# Patient Record
Sex: FEMALE | Race: WHITE
[De-identification: names, ages, dates, MRNs, and addresses within clinical notes are randomized per-mention and may not be internally consistent; named-entity substitution may affect disease eponyms.]

---

## 2017-05-10 ENCOUNTER — HOSPITAL ENCOUNTER (OUTPATIENT)
Dept: HOSPITAL 56 - MW.ONC | Age: 82
Discharge: HOME | End: 2017-05-10
Attending: INTERNAL MEDICINE
Payer: MEDICARE

## 2017-05-10 DIAGNOSIS — C85.80: Primary | ICD-10-CM

## 2017-05-10 LAB
CHLORIDE SERPL-SCNC: 104 MMOL/L (ref 98–110)
SODIUM SERPL-SCNC: 134 MMOL/L (ref 136–146)

## 2017-12-31 ENCOUNTER — HOSPITAL ENCOUNTER (INPATIENT)
Dept: HOSPITAL 56 - MW.ED | Age: 82
LOS: 5 days | Discharge: NURSING, REHAB OR LONG-TERM CARE | DRG: 178 | End: 2018-01-05
Attending: INTERNAL MEDICINE | Admitting: INTERNAL MEDICINE
Payer: MEDICARE

## 2017-12-31 DIAGNOSIS — Z85.72: ICD-10-CM

## 2017-12-31 DIAGNOSIS — J13: ICD-10-CM

## 2017-12-31 DIAGNOSIS — N17.9: ICD-10-CM

## 2017-12-31 DIAGNOSIS — Z79.899: ICD-10-CM

## 2017-12-31 DIAGNOSIS — R06.02: ICD-10-CM

## 2017-12-31 DIAGNOSIS — R13.10: ICD-10-CM
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
## 2017-12-31 DIAGNOSIS — N39.0: ICD-10-CM

## 2017-12-31 DIAGNOSIS — M06.9: ICD-10-CM

## 2017-12-31 DIAGNOSIS — T45.1X5A: ICD-10-CM

## 2017-12-31 DIAGNOSIS — J69.0: Primary | ICD-10-CM

## 2017-12-31 DIAGNOSIS — K90.0: ICD-10-CM

## 2017-12-31 DIAGNOSIS — Z88.2: ICD-10-CM

## 2017-12-31 DIAGNOSIS — Z88.8: ICD-10-CM

## 2017-12-31 DIAGNOSIS — R53.1: ICD-10-CM

## 2017-12-31 DIAGNOSIS — R47.81: ICD-10-CM

## 2017-12-31 DIAGNOSIS — T50.8X5A: ICD-10-CM

## 2017-12-31 DIAGNOSIS — Z88.0: ICD-10-CM

## 2017-12-31 LAB
CHLORIDE SERPL-SCNC: 100 MMOL/L (ref 98–110)
SODIUM SERPL-SCNC: 132 MMOL/L (ref 136–146)

## 2017-12-31 PROCEDURE — 87077 CULTURE AEROBIC IDENTIFY: CPT

## 2017-12-31 PROCEDURE — 85025 COMPLETE CBC W/AUTO DIFF WBC: CPT

## 2017-12-31 PROCEDURE — 84484 ASSAY OF TROPONIN QUANT: CPT

## 2017-12-31 PROCEDURE — 87086 URINE CULTURE/COLONY COUNT: CPT

## 2017-12-31 PROCEDURE — 70450 CT HEAD/BRAIN W/O DYE: CPT

## 2017-12-31 PROCEDURE — 85610 PROTHROMBIN TIME: CPT

## 2017-12-31 PROCEDURE — 36415 COLL VENOUS BLD VENIPUNCTURE: CPT

## 2017-12-31 PROCEDURE — 36600 WITHDRAWAL OF ARTERIAL BLOOD: CPT

## 2017-12-31 PROCEDURE — 87040 BLOOD CULTURE FOR BACTERIA: CPT

## 2017-12-31 PROCEDURE — 87186 SC STD MICRODIL/AGAR DIL: CPT

## 2017-12-31 PROCEDURE — 82803 BLOOD GASES ANY COMBINATION: CPT

## 2017-12-31 PROCEDURE — 99285 EMERGENCY DEPT VISIT HI MDM: CPT

## 2017-12-31 PROCEDURE — 93005 ELECTROCARDIOGRAM TRACING: CPT

## 2017-12-31 PROCEDURE — 71010: CPT

## 2017-12-31 PROCEDURE — 96361 HYDRATE IV INFUSION ADD-ON: CPT

## 2017-12-31 PROCEDURE — 87804 INFLUENZA ASSAY W/OPTIC: CPT

## 2017-12-31 PROCEDURE — 83880 ASSAY OF NATRIURETIC PEPTIDE: CPT

## 2017-12-31 PROCEDURE — 81001 URINALYSIS AUTO W/SCOPE: CPT

## 2017-12-31 PROCEDURE — 94640 AIRWAY INHALATION TREATMENT: CPT

## 2017-12-31 PROCEDURE — 80053 COMPREHEN METABOLIC PANEL: CPT

## 2017-12-31 NOTE — PCM.HP
H&P History of Present Illness





- General


Admit Problem/Dx: 


 Admission Diagnosis/Problem





Admission Diagnosis/Problem      UTI, Urinary tract infectious disease











- History of Present Illness


Initial Comments - Free Text/Narative: 





89 yo female who presents with  shortness of breath, productive cough, and 

generalized weakness.  Patient reports have cold with post nasal drip several 

days prior to shortness of breath.  She denies any chest pain.  She was noted 

to have a fever of 39.1.  CXR showed no focal consolidation and UA had +2 

bacteria.





- Related Data


Allergies/Adverse Reactions: 


 Allergies











Allergy/AdvReac Type Severity Reaction Status Date / Time


 


cephalexin monohydrate Allergy  Hives Verified 12/31/17 14:41





[From Keflex]     


 


ciprofloxacin [From Cipro] Allergy  Other Verified 12/31/17 14:42


 


erythromycin base Allergy  Hives Verified 12/31/17 14:41


 


gluten Allergy  Stomach Verified 12/31/17 14:41





   Ache  


 


Penicillins Allergy  Hives Verified 12/31/17 14:41


 


Sulfa (Sulfonamide Allergy  Hives Verified 12/31/17 14:41





Antibiotics)     


 


contrast Dye Allergy  Hives Uncoded 12/31/17 14:41


 


tape adhesives (USE PAPER Allergy  Swelling Uncoded 12/31/17 14:41





TAPE ONLY     











Home Medications: 


 Home Meds





Calcitonin ([place]) [Miacalcin Nasal Spray] 1 inh INH DAILY 01/21/15 [History]


Levothyroxine Sodium [Synthroid] 1 tab PO ACBRK 01/21/15 [History]


Magnesium Oxide 400 mg PO BID 01/21/15 [History]


Multivitamin [Multi Vitamin Daily] 1 tab PO DAILY 01/21/15 [History]


Potassium Chloride 40 meq PO DAILY 01/21/15 [History]


Fluticasone Propionate [Flovent] 50 mcg NASBOTH DAILY PRN 12/31/17 [History]


Lutein/Minerals/Vit A,C & E [Ocuvite] PO DAILY 12/31/17 [History]











Past Medical History


HEENT History: Reports: Hard of Hearing, Impaired Vision, Other (See Below)


Other HEENT History: blurry vision and pain to left eye


Cardiovascular History: Reports: Arrhythmia, High Cholesterol


Respiratory History: Reports: Other (See Below)


Other Respiratory History: Oxygen use at NOC at home


Gastrointestinal History: Reports: Celiac Disease


Genitourinary History: Reports: Urinary Incontinence, UTI, Recurrent


Musculoskeletal History: Reports: Osteoporosis, RA


Neurological History: Reports: None


Psychiatric History: Reports: Anxiety, Depression


Endocrine/Metabolic History: Reports: Hyperthyroidism, Osteoporosis


Hematologic History: Reports: Anemia, Blood Transfusion(s)


Oncologic (Cancer) History: Reports: Lymphoma, Other (See Below)


Other Oncologic History: Waldenstrom's Lymphoma affecting lungs, abdomen, and 

sinuses


Dermatologic History: Reports: None





- Infectious Disease History


Infectious Disease History: Reports: Hepatitis non A,B,C, Measles, MRSA, Mumps, 

Rubella, Other (See Below)


Other Infectious Disease History: patient cleared for MRSA





- Past Surgical History


HEENT Surgical History: Reports: Cataract Surgery


Cardiovascular Surgical History: Reports: None


Respiratory Surgical History: Reports: None


GI Surgical History: Reports: Other (See Below)


Other GI Surgeries/Procedures: hx. feeding tube placement, no longer in place


Female  Surgical History: Reports: Hysterectomy


Neurological Surgical History: Reports: None


Musculoskeletal Surgical History: Reports: None





Social & Family History





- Family History


Family Medical History: Noncontributory





- Tobacco Use


Smoking Status *Q: Never Smoker


Second Hand Smoke Exposure: No





- Caffeine Use


Caffeine Use: Reports: Coffee


Other Caffeine Use: 1 cup per day





- Alcohol Use


Days Per Week of Alcohol Use: 0


Number of Drinks Per Day: 0


Total Drinks Per Week: 0





- Recreational Drug Use


Recreational Drug Use: No


Drug Use in Last 12 Months: No





H&P Review of Systems





- Review of Systems:


Review Of Systems: ROS reveals no pertinent complaints other than HPI.





Exam





- Exam


Exam: See Below





- Vital Signs


Vital Signs: 


 Last Vital Signs











Temp  36.6 C   12/31/17 17:45


 


Pulse  80   12/31/17 17:45


 


Resp  22 H  12/31/17 17:45


 


BP  88/50 L  12/31/17 17:45


 


Pulse Ox  96   12/31/17 17:45











Weight: 39.5 kg





- Exam


General: Alert, Oriented


HEENT: Mucosa Moist & [place]


Neck: No: JVD


Lungs: Normal Respiratory Effort, Decreased Breath Sounds


Cardiovascular: Regular Rate, Regular Rhythm


GI/Abdominal Exam: Soft, Non-Tender, No Distention


Extremities: Non-Tender, No Pedal Edema


Skin: Warm, Dry, Intact





- Patient Data


Result Diagrams: 


 01/01/18 06:15





 01/01/18 06:15





*Q Meaningful Use (ADM)





- VTE *Q


VTE Criteria *Q: 








- Stroke *Q


Stroke Criteria *Q: 








- AMI *Q


AMI Criteria *Q: 





Problem List Initiated/Reviewed/Updated: Yes


Orders Last 24hrs: 


 Active Orders 24 hr











 Category Date Time Status


 


 Antiembolic Devices [RC] PER UNIT ROUTINE Care  12/31/17 21:27 Ordered


 


 Oxygen Therapy [RC] PRN Care  12/31/17 21:25 Ordered


 


 RT Aerosol Therapy [RC] ASDIRECTED Care  12/31/17 21:27 Ordered


 


 VTE/DVT Education [RC] PER UNIT ROUTINE Care  12/31/17 21:25 Ordered


 


 Vital Signs [RC] Q4H Care  12/31/17 21:25 Ordered


 


 Gluten Free Diet [DIET] Diet  01/01/18 Breakfast Active


 


 BASIC METABOLIC PANEL,BMP [CHEM] AM Lab  01/01/18 05:11 Ordered


 


 BASIC METABOLIC PANEL,BMP [CHEM] AM Lab  01/02/18 05:11 Ordered


 


 CBC W/O DIFF,HEMOGRAM [HEME] AM Lab  01/01/18 05:11 Ordered


 


 CBC W/O DIFF,HEMOGRAM [HEME] AM Lab  01/02/18 05:11 Ordered


 


 CULTURE SPUTUM + SMEAR [RM] Stat Lab  12/31/17 21:25 Uncollected


 


 Albuterol/Ipratropium [DuoNeb 3.0-0.5 MG/3 ML] Med  12/31/17 21:25 Ordered





 3 ml NEB Q4HRRT PRN   


 


 Heparin Sodium Med  01/01/18 09:00 Ordered





 5,000 units SUBCUT Q12HR   


 


 Levofloxacin/Dextrose 5%-Water [Levaquin in D5W 750 MG/ Med  01/01/18 17:00 

Ordered





 150 ML] 750 mg   





 Premix Bag 1 bag   





 IV Q24H   


 


 Ondansetron [Zofran] Med  12/31/17 21:25 Ordered





 4 mg IVPUSH Q4H PRN   


 


 Sodium Chloride 0.9% [Normal Saline] 1,000 ml Med  12/31/17 18:30 Active





 IV ASDIRECTED   


 


 Sequential Compression Device [OM.PC] Per Unit Routine Oth  12/31/17 21:26 

Ordered


 


 Resuscitation Status Routine Resus Stat  12/31/17 21:25 Ordered








 Medication Orders





Sodium Chloride (Normal Saline)  1,000 mls @ 125 mls/hr IV STAT ABBEY


   Last Infusion: 12/31/17 14:35  Dose: 999 mls/hr


   Admin: 12/31/17 13:49  Dose: 125 mls/hr


Sodium Chloride (Normal Saline)  1,000 mls @ 75 mls/hr IV ASDIRECTED ABBEY


Levofloxacin/Dextrose 750 mg/ (Premix)  150 mls @ 100 mls/hr IV Q24H ABBEY


Sodium Chloride (Saline Flush)  10 ml FLUSH ASDIRECTED PRN


   PRN Reason: Keep Vein Open


   Last Admin: 12/31/17 13:49  Dose: 10 ml


Sodium Chloride (Saline Flush)  2.5 ml FLUSH ASDIRECTED PRN


   PRN Reason: Keep Vein Open


   Last Admin: 12/31/17 13:49  Dose: 2.5 ml








Assessment/Plan Comment:: 





89 yo female admitted for UTI and possible pneumonia.  We will treat with 

levaquin.  Cultures are pending.

## 2017-12-31 NOTE — EDM.PDOC
ED HPI GENERAL MEDICAL PROBLEM





- General


Stated Complaint: AMBULANCE


Time Seen by Provider: 12/31/17 13:20





- History of Present Illness


INITIAL COMMENTS - FREE TEXT/NARRATIVE: 


HISTORY AND PHYSICAL:





History of present illness:


Patient 88-year-old female history of lymphoma who presents with a concern of 

shortness of breath generalized weakness and slurred speech per daughter she 

equivocates regarding chest pain and no abdominal pain vomiting or other 

concerns. Upon arrival her saturations in the high 80s.





Review of systems: 


As per history of present illness and below otherwise all systems reviewed and 

negative.





Past medical history: 


As per history of present illness and as reviewed below otherwise 

noncontributory.





Surgical history: 


As per history of present illness and as reviewed below otherwise 

noncontributory.





Social history: 


No reported history of drug or alcohol abuse.





Family history: 


As per history of present illness and as reviewed below otherwise 

noncontributory.





Physical exam:


HEENT: Atraumatic, normocephalic, pupils reactive, negative for conjunctival 

pallor or scleral icterus, mucous membranes moist, throat clear, neck supple, 

nontender, trachea midline.


Lungs: Coarse breath sounds equal bilaterally, chest nontender.


Heart: S1S2, regular, negative for clicks, rubs, or JVD.


Abdomen: Soft, nondistended, nontender. Negative for masses or 

hepatosplenomegaly. Negative for costovertebral tenderness.


Pelvis: Stable nontender.


Genitourinary: Deferred.


Rectal: Deferred.


Extremities: Atraumatic, negative for cords or calf pain. Neurovascular 

unremarkable.


Neuro: Awake, alert, follows commands and moves all extremities extremely hard 

of hearing limited but grossly nonfocal exam Diagnostics:


CBC CMP PT/INR troponin UA urine culture sensitivity blood cultures 2 chest x-

ray CT brain EKG





Therapeutics:


IV O2 monitor





Impression: 


#1 dyspnea #2 hypoxemia #3 history of lymphoma #4 history of slurred speech





Definitive disposition and diagnosis as appropriate pending reevaluation and 

review of above.








- Related Data


 Allergies











Allergy/AdvReac Type Severity Reaction Status Date / Time


 


cephalexin monohydrate Allergy  Hives Verified 12/31/17 14:41





[From Keflex]     


 


ciprofloxacin [From Cipro] Allergy  Other Verified 12/31/17 14:42


 


erythromycin base Allergy  Hives Verified 12/31/17 14:41


 


gluten Allergy  Stomach Verified 12/31/17 14:41





   Ache  


 


Penicillins Allergy  Hives Verified 12/31/17 14:41


 


Sulfa (Sulfonamide Allergy  Hives Verified 12/31/17 14:41





Antibiotics)     


 


contrast Dye Allergy  Hives Uncoded 12/31/17 14:41


 


tape adhesives (USE PAPER Allergy  Swelling Uncoded 12/31/17 14:41





TAPE ONLY     











Home Meds: 


 Home Meds





Calcitonin ([place]) [Miacalcin Nasal Spray] 1 inh INH DAILY 01/21/15 [History]


Levothyroxine Sodium [Synthroid] 1 tab PO ACBRK 01/21/15 [History]


Magnesium Oxide 400 mg PO BID 01/21/15 [History]


Multivitamin [Multi Vitamin Daily] 1 tab PO DAILY 01/21/15 [History]


Potassium Chloride 40 meq PO DAILY 01/21/15 [History]


Triamcinolone Acetonide [Nasacort AQ [place]] 2 spray THERESA BID 01/21/15 [History]











Past Medical History


Oncologic (Cancer) History: Reports: Lymphoma, Other (See Below)


Other Oncologic History: Blood cancer and some other type of cancer of her 

throat/esophageal area but pt. wasn't exactly sure what type of cancer.





Social & Family History





- Family History


Family Medical History: Noncontributory





- Tobacco Use


Smoking Status *Q: Never Smoker


Second Hand Smoke Exposure: No





- Caffeine Use


Caffeine Use: Reports: None





- Alcohol Use


Days Per Week of Alcohol Use: 0


Number of Drinks Per Day: 0


Total Drinks Per Week: 0





- Recreational Drug Use


Recreational Drug Use: No


Drug Use in Last 12 Months: No





ED ROS GENERAL





- Review of Systems


Review Of Systems: ROS reveals no pertinent complaints other than HPI.





ED EXAM, GENERAL





- Physical Exam


Exam: See Below (See dictation)





Course





- Vital Signs


Last Recorded V/S: 


 Last Vital Signs











Temp  37.4 C   12/31/17 16:03


 


Pulse  98   12/31/17 15:31


 


Resp  12   12/31/17 15:31


 


BP  77/44 L  12/31/17 15:31


 


Pulse Ox  98   12/31/17 15:31














- Orders/Labs/Meds


Orders: 


 Active Orders 24 hr











 Category Date Time Status


 


 Cardiac Monitoring [RC] .AS DIRECTED Care  12/31/17 13:23 Active


 


 EKG Documentation Completion [RC] STAT Care  12/31/17 13:23 Active


 


 Oxygen Therapy, ED [RC] ASDIRECTED Care  12/31/17 13:23 Active


 


 Pulse Oximetry [RC] ASDIRECTED Care  12/31/17 13:23 Active


 


 RT Aerosol Therapy [RC] ASDIRECTED Care  12/31/17 13:26 Active


 


 Chest 1V Frontal [CR] Stat Exams  12/31/17 13:26 Taken


 


 Head wo Cont [CT] Stat Exams  12/31/17 13:26 Taken


 


 ABG [BLOOD GAS ARTERIAL] [BG] Stat Lab  12/31/17 15:53 Ordered


 


 CULTURE BLOOD [BC] Stat Lab  12/31/17 13:37 Received


 


 CULTURE BLOOD [BC] Stat Lab  12/31/17 14:02 Received


 


 CULTURE URINE [RM] Stat Lab  12/31/17 13:22 Received


 


 Levofloxacin/Dextrose 5%-Water [Levaquin in D5W 750 MG/ Med  12/31/17 16:57 

Active





 150 ML] 750 mg   





 Premix Bag 1 bag   





 IV ONETIME   


 


 Sodium Chloride 0.9% [Normal Saline] 1,000 ml Med  12/31/17 16:51 Active





 IV .Bolus   


 


 Sodium Chloride 0.9% [Normal Saline] 1,000 ml Med  12/31/17 13:30 Active





 IV STAT   


 


 Sodium Chloride 0.9% [Saline Flush] Med  12/31/17 13:26 Active





 10 ml FLUSH ASDIRECTED PRN   


 


 Sodium Chloride 0.9% [Saline Flush] Med  12/31/17 13:26 Active





 2.5 ml FLUSH ASDIRECTED PRN   


 


 Blood Culture x2 Reflex Set [OM.PC] Stat Oth  12/31/17 13:25 Ordered


 


 Saline Lock Insert [OM.PC] Stat Oth  12/31/17 13:23 Ordered








 Medication Orders





Sodium Chloride (Normal Saline)  1,000 mls @ 125 mls/hr IV STAT [facility]


   Last Infusion: 12/31/17 14:35  Dose: 999 mls/hr


   Admin: 12/31/17 13:49  Dose: 125 mls/hr


Sodium Chloride (Normal Saline)  1,000 mls @ 999 mls/hr IV .Bolus ONE


   Stop: 12/31/17 17:51


Levofloxacin/Dextrose 750 mg/ (Premix)  150 mls @ 100 mls/hr IV ONETIME ONE


   Stop: 12/31/17 18:26


Sodium Chloride (Saline Flush)  10 ml FLUSH ASDIRECTED PRN


   PRN Reason: Keep Vein Open


   Last Admin: 12/31/17 13:49  Dose: 10 ml


Sodium Chloride (Saline Flush)  2.5 ml FLUSH ASDIRECTED PRN


   PRN Reason: Keep Vein Open


   Last Admin: 12/31/17 13:49  Dose: 2.5 ml








Labs: 


 Laboratory Tests











  12/31/17 12/31/17 12/31/17 Range/Units





  13:22 13:24 13:24 


 


WBC   11.60 H   (4.0-11.0)  K/uL


 


RBC   4.09 L   (4.30-5.90)  M/uL


 


Hgb   12.5   (12.0-16.0)  g/dL


 


Hct   37.4   (36.0-46.0)  %


 


MCV   91.4   (80.0-98.0)  fL


 


MCH   30.6   (27.0-32.0)  pg


 


MCHC   33.4   (31.0-37.0)  g/dL


 


RDW Std Deviation   49.9   (28.0-62.0)  fl


 


RDW Coeff of [place]non   15   (11.0-15.0)  %


 


Plt Count   279   (150-400)  K/uL


 


MPV   9.20   (7.40-12.00)  fL


 


Neut % (Auto)   81.8 H   (48.0-80.0)  %


 


Lymph % (Auto)   14.5 L   (16.0-40.0)  %


 


Mono % (Auto)   3.3   (0.0-15.0)  %


 


Eos % (Auto)   0.3   (0.0-7.0)  %


 


Baso % (Auto)   0.1   (0.0-1.5)  %


 


Neut # (Auto)   9.5 H   (1.4-5.7)  K/uL


 


Lymph # (Auto)   1.7   (0.6-2.4)  K/uL


 


Mono # (Auto)   0.4   (0.0-0.8)  K/uL


 


Eos # (Auto)   0.0   (0.0-0.7)  K/uL


 


Baso # (Auto)   0.0   (0.0-0.1)  K/uL


 


Nucleated RBC %   0.0   /100WBC


 


Nucleated RBCs #   0   K/uL


 


INR    1.05  (0.86-1.11)  


 


ABG pH     (7.35-7.45)  


 


ABG pCO2     (35-45)  mmHG


 


ABG pO2     ()  mmHG


 


ABG HCO3     (22-26)  mEq/L


 


ABG Total CO2     


 


ABG Base Excess     (-2.0-2.0)  


 


Sodium     (136-146)  mmol/L


 


Potassium     (3.5-5.1)  mmol/L


 


Chloride     ()  mmol/L


 


Carbon Dioxide     (21-31)  mmol/L


 


BUN     (6.0-23.0)  mg/dL


 


Creatinine     (0.6-1.5)  mg/dL


 


Est Cr Clr Drug Dosing     


 


Estimated GFR (MDRD)     ml/min


 


Glucose     ()  mg/dL


 


Calcium     (8.8-10.8)  mg/dL


 


Total Bilirubin     (0.1-1.5)  mg/dL


 


AST     (5-40)  IU/L


 


ALT     (8-54)  IU/L


 


Alkaline Phosphatase     ()  


 


Troponin I     (0.0-0.29)  NG/ML


 


B-Natriuretic Peptide     (<100)  PG/ML


 


Total Protein     (6.0-8.0)  g/dL


 


Albumin     (3.4-4.8)  g/dL


 


Globulin     (2.0-3.5)  g/dL


 


Albumin/Globulin Ratio     (1.3-2.8)  


 


Urine Color  YELLOW    


 


Urine Appearance  CLEAR    


 


Urine pH  6.0    (5.0-8.0)  


 


Ur Specific Gravity  1.025    (1.001-1.035)  


 


Urine Protein  30    (NEGATIVE)  mg/dL


 


Urine Glucose (UA)  NEGATIVE    (NEGATIVE)  mg/dL


 


Urine Ketones  NEGATIVE    (NEGATIVE)  mg/dL


 


Urine Occult Blood  TRACE-INTACT    (NEGATIVE)  


 


Urine Nitrite  NEGATIVE    (NEGATIVE)  


 


Urine Bilirubin  NEGATIVE    (NEGATIVE)  


 


Urine Urobilinogen  0.2    (<2.0)  EU/dL


 


Ur Leukocyte Esterase  NEGATIVE    (NEGATIVE)  


 


Urine RBC  0-1    (0-2/HPF)  


 


Urine WBC  2-3    (0-5/HPF)  


 


Ur Epithelial Cells  OCCASIONAL    (NONE-FEW)  


 


Urine Bacteria  2+ H    (NEGATIVE)  


 


Hyaline Casts  2-3    (0-2/LPF)  


 


Coarse Granular Casts  4-6    (NEGATIVE)  














  12/31/17 12/31/17 12/31/17 Range/Units





  13:24 13:24 15:25 


 


WBC     (4.0-11.0)  K/uL


 


RBC     (4.30-5.90)  M/uL


 


Hgb     (12.0-16.0)  g/dL


 


Hct     (36.0-46.0)  %


 


MCV     (80.0-98.0)  fL


 


MCH     (27.0-32.0)  pg


 


MCHC     (31.0-37.0)  g/dL


 


RDW Std Deviation     (28.0-62.0)  fl


 


RDW Coeff of [place]non     (11.0-15.0)  %


 


Plt Count     (150-400)  K/uL


 


MPV     (7.40-12.00)  fL


 


Neut % (Auto)     (48.0-80.0)  %


 


Lymph % (Auto)     (16.0-40.0)  %


 


Mono % (Auto)     (0.0-15.0)  %


 


Eos % (Auto)     (0.0-7.0)  %


 


Baso % (Auto)     (0.0-1.5)  %


 


Neut # (Auto)     (1.4-5.7)  K/uL


 


Lymph # (Auto)     (0.6-2.4)  K/uL


 


Mono # (Auto)     (0.0-0.8)  K/uL


 


Eos # (Auto)     (0.0-0.7)  K/uL


 


Baso # (Auto)     (0.0-0.1)  K/uL


 


Nucleated RBC %     /100WBC


 


Nucleated RBCs #     K/uL


 


INR     (0.86-1.11)  


 


ABG pH    7.465 H  (7.35-7.45)  


 


ABG pCO2    24 L  (35-45)  mmHG


 


ABG pO2    65 L  ()  mmHG


 


ABG HCO3    17 L  (22-26)  mEq/L


 


ABG Total CO2    15.2  


 


ABG Base Excess    -5.2 L  (-2.0-2.0)  


 


Sodium  132 L    (136-146)  mmol/L


 


Potassium  4.6    (3.5-5.1)  mmol/L


 


Chloride  100    ()  mmol/L


 


Carbon Dioxide  16 L    (21-31)  mmol/L


 


BUN  41 H    (6.0-23.0)  mg/dL


 


Creatinine  1.7 H    (0.6-1.5)  mg/dL


 


Est Cr Clr Drug Dosing  TNP    


 


Estimated GFR (MDRD)  28.4    ml/min


 


Glucose  107    ()  mg/dL


 


Calcium  9.1    (8.8-10.8)  mg/dL


 


Total Bilirubin  0.7    (0.1-1.5)  mg/dL


 


AST  56 H    (5-40)  IU/L


 


ALT  46    (8-54)  IU/L


 


Alkaline Phosphatase  68    ()  


 


Troponin I  0.10    (0.0-0.29)  NG/ML


 


B-Natriuretic Peptide   424 H   (<100)  PG/ML


 


Total Protein  8.5 H    (6.0-8.0)  g/dL


 


Albumin  3.3 L    (3.4-4.8)  g/dL


 


Globulin  5.2 H    (2.0-3.5)  g/dL


 


Albumin/Globulin Ratio  0.6 L    (1.3-2.8)  


 


Urine Color     


 


Urine Appearance     


 


Urine pH     (5.0-8.0)  


 


Ur Specific Gravity     (1.001-1.035)  


 


Urine Protein     (NEGATIVE)  mg/dL


 


Urine Glucose (UA)     (NEGATIVE)  mg/dL


 


Urine Ketones     (NEGATIVE)  mg/dL


 


Urine Occult Blood     (NEGATIVE)  


 


Urine Nitrite     (NEGATIVE)  


 


Urine Bilirubin     (NEGATIVE)  


 


Urine Urobilinogen     (<2.0)  EU/dL


 


Ur Leukocyte Esterase     (NEGATIVE)  


 


Urine RBC     (0-2/HPF)  


 


Urine WBC     (0-5/HPF)  


 


Ur Epithelial Cells     (NONE-FEW)  


 


Urine Bacteria     (NEGATIVE)  


 


Hyaline Casts     (0-2/LPF)  


 


Coarse Granular Casts     (NEGATIVE)  











Meds: 


Medications











Generic Name Dose Route Start Last Admin





  Trade Name Freq  PRN Reason Stop Dose Admin


 


Sodium Chloride  1,000 mls @ 125 mls/hr  12/31/17 13:30  12/31/17 14:35





  Normal Saline  IV   999 mls/hr





  STAT ABBEY   Infusion


 


Sodium Chloride  1,000 mls @ 999 mls/hr  12/31/17 16:51  





  Normal Saline  IV  12/31/17 17:51  





  .Bolus ONE   


 


Levofloxacin/Dextrose 750 mg/  150 mls @ 100 mls/hr  12/31/17 16:57  





  Premix  IV  12/31/17 18:26  





  ONETIME ONE   


 


Sodium Chloride  10 ml  12/31/17 13:26  12/31/17 13:49





  Saline Flush  FLUSH   10 ml





  ASDIRECTED PRN   Administration





  Keep Vein Open   


 


Sodium Chloride  2.5 ml  12/31/17 13:26  12/31/17 13:49





  Saline Flush  FLUSH   2.5 ml





  ASDIRECTED PRN   Administration





  Keep Vein Open   














Discontinued Medications














Generic Name Dose Route Start Last Admin





  Trade Name Freq  PRN Reason Stop Dose Admin


 


Acetaminophen  1,000 mg  12/31/17 14:14  12/31/17 14:34





  Tylenol Extra Strength  PO  12/31/17 14:15  Not Given





  ONETIME ONE   


 


Albuterol/Ipratropium  3 ml  12/31/17 13:26  12/31/17 13:33





  Duoneb 3.0-0.5 Mg/3 Ml  NEB  12/31/17 13:27  3 ml





  ONETIME ONE   Administration


 


Ibuprofen  200 mg  12/31/17 14:21  12/31/17 14:34





  Motrin  PO  12/31/17 14:22  Not Given





  ONETIME ONE   


 


Ibuprofen  400 mg  12/31/17 14:34  12/31/17 15:03





  Motrin  PO  12/31/17 14:35  400 mg





  ONETIME ONE   Administration


 


Ibuprofen  Confirm  12/31/17 14:33  12/31/17 15:02





  Motrin  Administered  12/31/17 14:34  Not Given





  Dose   





  400 mg   





  .ROUTE   





  .STK-MED ONE   














Departure





- Departure


Time of Disposition: 16:58


Disposition: Admitted As Inpatient 66


Condition: Good


Clinical Impression: 


 General weakness, UTI (urinary tract infection)








- Discharge Information


Referrals: 


PCP,None [Primary Care Provider] - 





- My Orders


Last 24 Hours: 


My Active Orders





12/31/17 13:22


CULTURE URINE [RM] Stat 





12/31/17 13:23


Cardiac Monitoring [RC] .AS DIRECTED 


EKG Documentation Completion [RC] STAT 


Oxygen Therapy, ED [RC] ASDIRECTED 


Pulse Oximetry [RC] ASDIRECTED 


Saline Lock Insert [OM.PC] Stat 





12/31/17 13:25


Blood Culture x2 Reflex Set [OM.PC] Stat 





12/31/17 13:26


RT Aerosol Therapy [RC] ASDIRECTED 


Chest 1V Frontal [CR] Stat 


Head wo Cont [CT] Stat 


Sodium Chloride 0.9% [Saline Flush]   10 ml FLUSH ASDIRECTED PRN 


Sodium Chloride 0.9% [Saline Flush]   2.5 ml FLUSH ASDIRECTED PRN 





12/31/17 13:30


Sodium Chloride 0.9% [Normal Saline] 1,000 ml IV STAT 





12/31/17 13:37


CULTURE BLOOD [BC] Stat 





12/31/17 14:02


CULTURE BLOOD [BC] Stat 





12/31/17 15:53


ABG [BLOOD GAS ARTERIAL] [BG] Stat 





12/31/17 16:51


Sodium Chloride 0.9% [Normal Saline] 1,000 ml IV .Bolus 





12/31/17 16:57


Levofloxacin/Dextrose 5%-Water [Levaquin in D5W 750 MG/150 ML] 750 mg   Premix 

Bag 1 bag IV ONETIME 














- Assessment/Plan


Last 24 Hours: 


My Active Orders





12/31/17 13:22


CULTURE URINE [RM] Stat 





12/31/17 13:23


Cardiac Monitoring [RC] .AS DIRECTED 


EKG Documentation Completion [RC] STAT 


Oxygen Therapy, ED [RC] ASDIRECTED 


Pulse Oximetry [RC] ASDIRECTED 


Saline Lock Insert [OM.PC] Stat 





12/31/17 13:25


Blood Culture x2 Reflex Set [OM.PC] Stat 





12/31/17 13:26


RT Aerosol Therapy [RC] ASDIRECTED 


Chest 1V Frontal [CR] Stat 


Head wo Cont [CT] Stat 


Sodium Chloride 0.9% [Saline Flush]   10 ml FLUSH ASDIRECTED PRN 


Sodium Chloride 0.9% [Saline Flush]   2.5 ml FLUSH ASDIRECTED PRN 





12/31/17 13:30


Sodium Chloride 0.9% [Normal Saline] 1,000 ml IV STAT 





12/31/17 13:37


CULTURE BLOOD [BC] Stat 





12/31/17 14:02


CULTURE BLOOD [BC] Stat 





12/31/17 15:53


ABG [BLOOD GAS ARTERIAL] [BG] Stat 





12/31/17 16:51


Sodium Chloride 0.9% [Normal Saline] 1,000 ml IV .Bolus 





12/31/17 16:57


Levofloxacin/Dextrose 5%-Water [Levaquin in D5W 750 MG/150 ML] 750 mg   Premix 

Bag 1 bag IV ONETIME

## 2018-01-01 LAB
CHLORIDE SERPL-SCNC: 114 MMOL/L (ref 98–110)
SODIUM SERPL-SCNC: 137 MMOL/L (ref 136–146)

## 2018-01-01 RX ADMIN — HYPROMELLOSES AND CARBOXYMETHYLCELLULOSE SODIUM SCH DROP: 3; 2.5 GEL OPHTHALMIC at 22:38

## 2018-01-01 RX ADMIN — DEXTROSE SCH UNITS: 10 SOLUTION INTRAVENOUS at 14:52

## 2018-01-01 RX ADMIN — Medication SCH DROP: at 22:38

## 2018-01-01 RX ADMIN — DEXTROSE SCH UNITS: 10 SOLUTION INTRAVENOUS at 22:38

## 2018-01-01 NOTE — PCM.PN
- Review of Systems


Systems Review Comment:: 





feeling better, reports generalized weakness, cough improved





- Patient Data


Vitals - Most Recent: 


 Last Vital Signs











Temp  36.5 C   01/01/18 11:37


 


Pulse  71   01/01/18 11:37


 


Resp  18   01/01/18 11:37


 


BP  91/52 L  01/01/18 11:37


 


Pulse Ox  97   01/01/18 11:37











Weight - Most Recent: 39.5 kg


I&O - Last 24 Hours: 


 Intake & Output











 01/01/18 01/01/18 01/01/18





 06:59 14:59 22:59


 


Intake Total 100  


 


Output Total 335  


 


Balance -235  











Lab Results Last 24 Hours: 


 Laboratory Results - last 24 hr











  01/01/18 01/01/18 Range/Units





  06:15 06:15 


 


WBC  20.17 H   (4.0-11.0)  K/uL


 


RBC  3.18 L   (4.30-5.90)  M/uL


 


Hgb  9.5 L   (12.0-16.0)  g/dL


 


Hct  28.6 L   (36.0-46.0)  %


 


MCV  89.9   (80.0-98.0)  fL


 


MCH  29.9   (27.0-32.0)  pg


 


MCHC  33.2   (31.0-37.0)  g/dL


 


RDW Std Deviation  48.5   (28.0-62.0)  fl


 


RDW Coeff of Brennon  15   (11.0-15.0)  %


 


Plt Count  178   (150-400)  K/uL


 


MPV  8.90   (7.40-12.00)  fL


 


Nucleated RBC %  0.0   /100WBC


 


Nucleated RBCs #  0   K/uL


 


Sodium   137  (136-146)  mmol/L


 


Potassium   3.5  (3.5-5.1)  mmol/L


 


Chloride   114 H  ()  mmol/L


 


Carbon Dioxide   13 L  (21-31)  mmol/L


 


BUN   37 H  (6.0-23.0)  mg/dL


 


Creatinine   1.4  (0.6-1.5)  mg/dL


 


Est Cr Clr Drug Dosing   17.32  mL/min


 


Estimated GFR (MDRD)   35.5  ml/min


 


Glucose   67  ()  mg/dL


 


Calcium   7.8 L  (8.8-10.8)  mg/dL











Med Orders - Current: 


 Current Medications





Albuterol/Ipratropium (Duoneb 3.0-0.5 Mg/3 Ml)  3 ml NEB Q4HRRT PRN


   PRN Reason: Shortness Of Breath/wheezing


Heparin Sodium (Porcine) (Heparin Sodium)  5,000 units SUBCUT Q12HR ABBEY


   Last Admin: 01/01/18 14:52 Dose:  5,000 units


Sodium Chloride (Normal Saline)  1,000 mls @ 125 mls/hr IV STAT [facility]


   Last Infusion: 12/31/17 14:35 Dose:  999 mls/hr


Sodium Chloride (Normal Saline)  1,000 mls @ 75 mls/hr IV ASDIRECTED [facility]


   Last Admin: 01/01/18 12:35 Dose:  75 mls/hr


Levofloxacin/Dextrose 250 mg/ (Premix)  50 mls @ 100 mls/hr IV Q48H ABBEY


Sodium Chloride (Normal Saline)  500 mls @ 500 mls/hr IV .BOLUS [facility]


   Last Admin: 12/31/17 22:18 Dose:  500 mls/hr


Meropenem 500 mg/ Sodium (Chloride)  50 mls @ 100 mls/hr IV Q12H [facility]


   Last Admin: 01/01/18 14:46 Dose:  100 mls/hr


Ondansetron HCl (Zofran)  4 mg IVPUSH Q4H PRN


   PRN Reason: Nausea


Sodium Chloride (Saline Flush)  10 ml FLUSH ASDIRECTED PRN


   PRN Reason: Keep Vein Open


   Last Admin: 12/31/17 13:49 Dose:  10 ml


Sodium Chloride (Saline Flush)  2.5 ml FLUSH ASDIRECTED PRN


   PRN Reason: Keep Vein Open


   Last Admin: 12/31/17 13:49 Dose:  2.5 ml


Vancomycin HCl (Pharmacy To Dose - Vancomycin)  1 dose .XX ASDIRECTED [facility]





Discontinued Medications





Acetaminophen (Tylenol Extra Strength)  1,000 mg PO ONETIME ONE


   Stop: 12/31/17 14:15


   Last Admin: 12/31/17 14:34 Dose:  Not Given


Albuterol/Ipratropium (Duoneb 3.0-0.5 Mg/3 Ml)  3 ml NEB ONETIME ONE


   Stop: 12/31/17 13:27


   Last Admin: 12/31/17 13:33 Dose:  3 ml


Sodium Chloride (Normal Saline)  1,000 mls @ 999 mls/hr IV .Bolus ONE


   Stop: 12/31/17 17:51


   Last Admin: 12/31/17 17:13 Dose:  999 mls/hr


Levofloxacin/Dextrose 750 mg/ (Premix)  150 mls @ 100 mls/hr IV ONETIME ONE


   Stop: 12/31/17 18:26


   Last Admin: 12/31/17 17:13 Dose:  100 mls/hr


Meropenem 1 gm/ Sodium (Chloride)  100 mls @ 200 mls/hr IV Q8H [facility]BE[facility]


Ibuprofen (Motrin)  200 mg PO ONETIME ONE


   Stop: 12/31/17 14:22


   Last Admin: 12/31/17 14:34 Dose:  Not Given


Ibuprofen (Motrin)  400 mg PO ONETIME ONE


   Stop: 12/31/17 14:35


   Last Admin: 12/31/17 15:03 Dose:  400 mg


Ibuprofen (Motrin) Confirm Administered Dose 400 mg .ROUTE .STK-MED ONE


   Stop: 12/31/17 14:34


   Last Admin: 12/31/17 15:02 Dose:  Not Given











- Exam


General: Alert, Cooperative, No Acute Distress


Lungs: Clear to Auscultation, Normal Respiratory Effort


Cardiovascular: Regular Rate, Regular Rhythm


GI/Abdominal Exam: Soft, Non-Tender


Extremities: No Pedal Edema


Skin: Warm, Dry, Intact





- Problem List Review


Problem List Initiated/Reviewed/Updated: Yes





- My Orders


Last 24 Hours: 


My Active Orders





01/01/18 09:00


Heparin Sodium   5,000 units SUBCUT Q12HR 





01/01/18 11:55


Abdomen Pelvis wo Cont [CT] Routine 


Chest wo Cont [CT] Routine 





01/01/18 14:15


Meropenem 500 mg   Sodium Chloride 0.9% [Normal Saline] 50 ml IV Q12H 





01/01/18 14:31


Hemoccult [OCCULT BLOOD DIAGNOSTIC] [OP] Routine 





01/01/18 15:30


Vancomycin Pharmacy to Dose [Pharmacy to Dose - Vancomycin]   1 dose .XX 

ASDIRECTED 





01/01/18 19:00


CULTURE BLOOD [BC] Routine 





01/01/18 Breakfast


Gluten Free Diet [DIET] 





01/02/18 05:11


BASIC METABOLIC PANEL,BMP [CHEM] AM 


CBC W/O DIFF,HEMOGRAM [HEME] AM 





01/02/18 17:00


Levofloxacin/Dextrose 5%-Water [Levaquin in D5W 250 MG/50 ML] 250 mg   Premix 

Bag 1 bag IV Q48H 





12/31/17 18:30


Sodium Chloride 0.9% [Normal Saline] 1,000 ml IV ASDIRECTED 





12/31/17 21:25


Oxygen Therapy [RC] PRN 


VTE/DVT Education [RC] PER UNIT ROUTINE 


Vital Signs [RC] Q4H 


CULTURE SPUTUM + SMEAR [RM] Stat 


Albuterol/Ipratropium [DuoNeb 3.0-0.5 MG/3 ML]   3 ml NEB Q4HRRT PRN 


Ondansetron [Zofran]   4 mg IVPUSH Q4H PRN 


Resuscitation Status Routine 





12/31/17 21:26


Sequential Compression Device [OM.PC] Per Unit Routine 





12/31/17 21:27


Antiembolic Devices [RC] PER UNIT ROUTINE 


RT Aerosol Therapy [RC] ASDIRECTED 





12/31/17 22:15


Sodium Chloride 0.9% [Normal Saline] 500 ml IV .BOLUS 














- Plan


Plan:: 





89 yo female admitted for UTI and possible pneumonia.  Blood cultures reported 

growing gram negative bacilli and then gram positive cocci.  WBC up to 20,170.  

Have expanding antibiotics to vancomycin, meropenum, and Levaquin.  Creatinine 

has improved to 1.4.  Will CT c/a/p to evaluate for source of infection.

## 2018-01-02 LAB
CHLORIDE SERPL-SCNC: 117 MMOL/L (ref 98–110)
SODIUM SERPL-SCNC: 140 MMOL/L (ref 136–146)

## 2018-01-02 RX ADMIN — CLOTRIMAZOLE SCH EACH: 10 LOZENGE ORAL; TOPICAL at 12:53

## 2018-01-02 RX ADMIN — DEXTROSE SCH UNITS: 10 SOLUTION INTRAVENOUS at 08:59

## 2018-01-02 RX ADMIN — Medication SCH DROP: at 05:48

## 2018-01-02 RX ADMIN — HYPROMELLOSES AND CARBOXYMETHYLCELLULOSE SODIUM SCH APPLIC: 3; 2.5 GEL OPHTHALMIC at 21:38

## 2018-01-02 RX ADMIN — Medication SCH DROP: at 21:37

## 2018-01-02 RX ADMIN — FLUTICASONE PROPIONATE SCH SPRAY: 50 SPRAY, METERED NASAL at 08:58

## 2018-01-02 RX ADMIN — Medication SCH DROP: at 13:01

## 2018-01-02 RX ADMIN — LEVOFLOXACIN SCH MLS/HR: 5 INJECTION, SOLUTION INTRAVENOUS at 16:34

## 2018-01-02 RX ADMIN — DEXTROSE SCH UNITS: 10 SOLUTION INTRAVENOUS at 21:39

## 2018-01-02 NOTE — CT
EXAM DATE: 17



PATIENT'S AGE: 88



Patient: ERIKA FISCHER



Facility: [place], [place]

Patient ID: [id number]

Site Patient ID: [id number].

Site Accession #: [id number].

: 1929

Study: CT Abdomen/Pelvis [id number]/1/2018 2:34:13 PM

Ordering Physician: Justen Eduardo



Final Report: 

Indication:

Bacteremia.



Comparison:

None available.



Technique:

CT chest, abdomen and pelvis without IV or oral contrast.



Findings:

Chest: Visualized thyroid is normal. Moderate atherosclerotic calcification of 
the non aneurysmal aorta. Multiple reactive appearing mediastinal subcentimeter 
lymph nodes. Probable retained secretions focally occludes the right lower lobe 
bronchus just after the takeoff from the bronchus intermedius. Moderately 
prominent airspace opacity with air bronchograms in the dependent right lower 
lobe and peribronchial right lower lobe. Small right pleural effusion is small 
and dependent. Some perihilar peribronchial opacity in the right middle lobe. 
Small highly likely benign 2-3 mm nodule lateral segment right upper lobe. No 
significant airspace opacity on the left. Pulmonary vascularity in the 
periphery is within normal limits for age. Mild senescent interstitial 
prominence at the periphery of both lungs. Biapical pleural scarring. No 
significant bone lesion or fracture. Wires from a previous left-sided cardiac 
pacing device seen in the left subclavian to the mid superior vena cava. 
Calcification mitral valve. Some calcification aortic valve. Heart size is 
normal. No pericardial effusion. Tiny left pleural effusion at the costophrenic 
angle.

Abdomen and pelvis: Cholecystectomy clips. Small splenule inferior hilum. 
Benign cyst left kidney. Some mild dystrophic amorphous calcification in both 
kidneys is in the periphery, may be within the capsule. Small saccular aneurysm 
of the aorta extends inferior and to the right mid infrarenal aorta. In 
greatest transverse diameter this measures about 18 x 12 mm (image 57 series 206
). The overall caliber of the aorta itself is not significantly increased. 
Diffuse atherosclerosis and ectasia. No pathologic retroperitoneal or 
mesenteric adenopathy. Some formed stool through the redundant colon. Small 
bowel appears unremarkable. No air or fluid in the peritoneum. Small 
diverticula of the somewhat thick-walled and trabeculated urinary bladder. Air 
and soft tissue fullness in the perineum may be rectocele. Uterus and adnexal 
structures are absent. Mild scoliosis to the left of the lower lumbar spine 
with degenerative disc and facet changes. No blastic or lytic bone lesion.



Impression:

1. Right lower lobe pneumonia. Aspiration pneumonia not excluded with some 
retained secretions suspected in the origin of the right lower lobe bronchus. 
Endoluminal mass lesion is felt less likely but not completely excluded. 
Followup to imaging to resolution recommended. 

2. Small bilateral pleural effusions.

3. Neurogenic bladder versus chronic outlet obstruction changes in the urinary 
bladder.

4. Clinical correlation for small rectocele recommended.

5. 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



Please note that all CT scans at this facility use dose modulation, iterative 
reconstruction, and/or weight-based dosing when appropriate to reduce radiation 
dose to as low as reasonably achievable.



Dictated by Robel Desai MD @ 2018 3:01PM

(Electronic Signature)



Report Signed by Proxy.
MTDD

## 2018-01-02 NOTE — CT
EXAM DATE: 17



PATIENT'S AGE: 88



Patient: ERIKA FISCHER



Facility: [place], [place]

Patient ID: [id number]

Site Patient ID: [id number].

Site Accession #: [id number].

: 1929

Study: CT Head [id number]/31/2017 3:18:31 PM

Ordering Physician: Julio Mosley



Final Report: 

Pain.



Technique: Noncontrast head CT comparison head CT 2016.



Findings:

Axial noncontrast images through the brain parenchyma demonstrate no acute 
intracranial hemorrhage or mass. No midline shift. There are periventricular 
hypo lucencies with generalized parenchymal volume loss likely reflecting 
chronic small vessel ischemic change. No abnormal extra-axial air or fluid 
collections. No midline shift. There is moderate mucosal thickening in the 
maxillary sinuses. Mucosal thickening in the ethmoid sinuses. The visualized 
paranasal sinuses, mastoid air cells skull scalp otherwise appears 
unremarkable. 



Impression:

1. No acute intracranial hemorrhage or mass.

2. Generalized parenchymal volume loss with periventricular hypo lucencies 
likely reflecting chronic small vessel ischemic change .

3. Ethmoid and maxillary sinus disease.



Please note that all CT scans at this facility use dose modulation, iterative 
reconstruction, and/or weight-based dosing when appropriate to reduce radiation 
dose to as low as reasonably achievable.



Dictated by Jill Melton MD @ Dec 31 2017 3:34PM

(Electronic Signature)



Report Signed by Proxy.
HAIM

## 2018-01-02 NOTE — CT
EXAM DATE: 17



PATIENT'S AGE: 88



Patient: ERIKA FISCHER



Facility: [place], [place]

Patient ID: [id number]

Site Patient ID: [id number].

Site Accession #: [id number].

: 1929

Study: CT Chest [id number]/1/2018 2:34:36 PM

Ordering Physician: Justen Eduardo



Final Report: 

Indication:

Bacteremia.



Comparison:

None available.



Technique:

CT chest, abdomen and pelvis without IV or oral contrast.



Findings:

Chest: Visualized thyroid is normal. Moderate atherosclerotic calcification of 
the non aneurysmal aorta. Multiple reactive appearing mediastinal subcentimeter 
lymph nodes. Probable retained secretions focally occludes the right lower lobe 
bronchus just after the takeoff from the bronchus intermedius. Moderately 
prominent airspace opacity with air bronchograms in the dependent right lower 
lobe and peribronchial right lower lobe. Small right pleural effusion is small 
and dependent. Some perihilar peribronchial opacity in the right middle lobe. 
Small highly likely benign 2-3 mm nodule lateral segment right upper lobe. No 
significant airspace opacity on the left. Pulmonary vascularity in the 
periphery is within normal limits for age. Mild senescent interstitial 
prominence at the periphery of both lungs. Biapical pleural scarring. No 
significant bone lesion or fracture. Wires from a previous left-sided cardiac 
pacing device seen in the left subclavian to the mid superior vena cava. 
Calcification mitral valve. Some calcification aortic valve. Heart size is 
normal. No pericardial effusion. Tiny left pleural effusion at the costophrenic 
angle.

Abdomen and pelvis: Cholecystectomy clips. Small splenule inferior hilum. 
Benign cyst left kidney. Some mild dystrophic amorphous calcification in both 
kidneys is in the periphery, may be within the capsule. Small saccular aneurysm 
of the aorta extends inferior and to the right mid infrarenal aorta. In 
greatest transverse diameter this measures about 18 x 12 mm (image 57 series 206
). The overall caliber of the aorta itself is not significantly increased. 
Diffuse atherosclerosis and ectasia. No pathologic retroperitoneal or 
mesenteric adenopathy. Some formed stool through the redundant colon. Small 
bowel appears unremarkable. No air or fluid in the peritoneum. Small 
diverticula of the somewhat thick-walled and trabeculated urinary bladder. Air 
and soft tissue fullness in the perineum may be rectocele. Uterus and adnexal 
structures are absent. Mild scoliosis to the left of the lower lumbar spine 
with degenerative disc and facet changes. No blastic or lytic bone lesion.



Impression:

1. Right lower lobe pneumonia. Aspiration pneumonia not excluded with some 
retained secretions suspected in the origin of the right lower lobe bronchus. 
Endoluminal mass lesion is felt less likely but not completely excluded. 
Followup to imaging to resolution recommended. 

2. Small bilateral pleural effusions.

3. Neurogenic bladder versus chronic outlet obstruction changes in the urinary 
bladder.

4. Clinical correlation for small rectocele recommended.

5. 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



Please note that all CT scans at this facility use dose modulation, iterative 
reconstruction, and/or weight-based dosing when appropriate to reduce radiation 
dose to as low as reasonably achievable.



Dictated by Robel Desai MD @ 2018 3:19PM

(Electronic Signature)



Report Signed by Proxy.
MTDD

## 2018-01-02 NOTE — PCM.PN
Addendum entered and electronically signed by Kristine Laboy NP  01/02/18 12:30

: 





After discussion with Dr. Campuzano, will de-escalate antibiotics, stop Meropenem 

and Vancomycin today. Keep Levaquin IV. 





Original Note:








- General Info


Date of Service: 01/02/18


Admission Dx/Problem (Free Text): 


 Admission Diagnosis/Problem





Admission Diagnosis/Problem      UTI, Urinary tract infectious disease








Subjective Update: 





Feeling a little better today, slept better last night. Cough is more loose 

today, but is unable to get anything up. Denies chest pain. Scant SOB. No 

palpitations. Continues to have malaise but this is improving. 


Functional Status: Reports: Pain Controlled, Tolerating Diet, Ambulating, 

Urinating





- Review of Systems


General: Reports: Weakness, Malaise


HEENT: Reports: No Symptoms.  Denies: Headaches, Sore Throat, Visual Changes


Pulmonary: Reports: Cough.  Denies: Shortness of Breath, Pleuritic Chest Pain, 

Sputum, Hemoptysis, Wheezing


Cardiovascular: Reports: No Symptoms.  Denies: Chest Pain, Palpitations, Edema, 

Lightheadedness


Gastrointestinal: Reports: No Symptoms, Flatus.  Denies: Abdominal Pain, 

Diarrhea, Nausea, Vomiting


Genitourinary: Reports: No Symptoms.  Denies: Dysuria, Frequency, Burning, Pain


Musculoskeletal: Reports: No Symptoms


Neurological: Reports: No Symptoms.  Denies: Confusion


Psychiatric: Reports: No Symptoms.  Denies: Confusion





- Patient Data


Vitals - Most Recent: 


 Last Vital Signs











Temp  97.9 F   01/02/18 04:00


 


Pulse  73   01/02/18 04:00


 


Resp  16   01/02/18 04:00


 


BP  102/57 L  01/02/18 04:00


 


Pulse Ox  95   01/02/18 04:00











Weight - Most Recent: 39.5 kg


I&O - Last 24 Hours: 


 Intake & Output











 01/01/18 01/02/18 01/02/18





 22:59 06:59 14:59


 


Intake Total 330 1094 


 


Output Total 520  


 


Balance -190 1094 











Lab Results Last 24 Hours: 


 Laboratory Results - last 24 hr











  01/02/18 01/02/18 Range/Units





  05:23 05:23 


 


WBC  13.23 H   (4.0-11.0)  K/uL


 


RBC  3.05 L   (4.30-5.90)  M/uL


 


Hgb  9.2 L   (12.0-16.0)  g/dL


 


Hct  27.5 L   (36.0-46.0)  %


 


MCV  90.2   (80.0-98.0)  fL


 


MCH  30.2   (27.0-32.0)  pg


 


MCHC  33.5   (31.0-37.0)  g/dL


 


RDW Std Deviation  49.5   (28.0-62.0)  fl


 


RDW Coeff of Brennon  15   (11.0-15.0)  %


 


Plt Count  172   (150-400)  K/uL


 


MPV  8.80   (7.40-12.00)  fL


 


Nucleated RBC %  0.0   /100WBC


 


Nucleated RBCs #  0   K/uL


 


Sodium   140  (136-146)  mmol/L


 


Potassium   3.3 L  (3.5-5.1)  mmol/L


 


Chloride   117 H  ()  mmol/L


 


Carbon Dioxide   14 L  (21-31)  mmol/L


 


BUN   35 H  (6.0-23.0)  mg/dL


 


Creatinine   1.4  (0.6-1.5)  mg/dL


 


Est Cr Clr Drug Dosing   17.32  mL/min


 


Estimated GFR (MDRD)   35.5  ml/min


 


Glucose   70  ()  mg/dL


 


Calcium   8.0 L  (8.8-10.8)  mg/dL











Med Orders - Current: 


 Current Medications





Albuterol/Ipratropium (Duoneb 3.0-0.5 Mg/3 Ml)  3 ml NEB Q4HRRT PRN


   PRN Reason: Shortness Of Breath/wheezing


Artificial Tears (Refresh Plus 0.5%)  0 each EYEBOTH TID Count includes the [facility]


   Last Admin: 01/02/18 05:48 Dose:  1 drop


Carboxymethylcellu Sod/Hypromellose (Genteal Moderate To Severe Ophth Gel)  0 

ml EYELF BEDTIME [facility]


   Last Admin: 01/01/18 22:38 Dose:  1 drop


Fluticasone Propionate (Flonase)  0 gm NASBOTH DAILY [facility]


   Last Admin: 01/02/18 08:58 Dose:  1 spray


Heparin Sodium (Porcine) (Heparin Sodium)  5,000 units SUBCUT Q12HR [facility]


   Last Admin: 01/02/18 08:59 Dose:  5,000 units


Sodium Chloride (Normal Saline)  1,000 mls @ 75 mls/hr IV ASDIRECTED Count includes the [facility]


   Last Admin: 01/02/18 05:48 Dose:  75 mls/hr


Levofloxacin/Dextrose 250 mg/ (Premix)  50 mls @ 100 mls/hr IV Q48H Count includes the [facility]


Meropenem 500 mg/ Sodium (Chloride)  50 mls @ 100 mls/hr IV Q12H Count includes the [facility]


   Last Admin: 01/02/18 01:19 Dose:  100 mls/hr


Vancomycin HCl 500 mg/ Sodium (Chloride)  100 mls @ 100 mls/hr IV Q24H Count includes the [facility]


   Last Admin: 01/01/18 18:27 Dose:  100 mls/hr


Levothyroxine Sodium (Levothyroxine)  37.5 mcg PO DAILY@0700 Count includes the [facility]


   Last Admin: 01/02/18 06:41 Dose:  37.5 mcg


Naproxen (Naproxen Sodium)  110 mg PO BEDTIME PRN


   PRN Reason: Pain


   Last Admin: 01/01/18 22:54 Dose:  110 mg


Ondansetron HCl (Zofran)  4 mg IVPUSH Q4H PRN


   PRN Reason: Nausea


Dulera Oral Inh 100/ (5  **Own Med**)  0 each INH DAILY@1200 Count includes the [facility]


Potassium Chloride (Klor-Con M20)  40 meq PO ONETIME ONE


   Stop: 01/02/18 09:10


Sodium Chloride (Saline Flush)  10 ml FLUSH ASDIRECTED PRN


   PRN Reason: Keep Vein Open


   Last Admin: 12/31/17 13:49 Dose:  10 ml


Sodium Chloride (Saline Flush)  2.5 ml FLUSH ASDIRECTED PRN


   PRN Reason: Keep Vein Open


   Last Admin: 12/31/17 13:49 Dose:  2.5 ml


Vancomycin HCl (Pharmacy To Dose - Vancomycin)  1 dose .XX ASDIRECTED Count includes the [facility]





Discontinued Medications





Acetaminophen (Tylenol Extra Strength)  1,000 mg PO ONETIME ONE


   Stop: 12/31/17 14:15


   Last Admin: 12/31/17 14:34 Dose:  Not Given


Albuterol/Ipratropium (Duoneb 3.0-0.5 Mg/3 Ml)  3 ml NEB ONETIME ONE


   Stop: 12/31/17 13:27


   Last Admin: 12/31/17 13:33 Dose:  3 ml


Sodium Chloride (Normal Saline)  1,000 mls @ 125 mls/hr IV STAT Count includes the [facility]


   Last Infusion: 12/31/17 14:35 Dose:  999 mls/hr


Sodium Chloride (Normal Saline)  1,000 mls @ 999 mls/hr IV .Bolus ONE


   Stop: 12/31/17 17:51


   Last Admin: 12/31/17 17:13 Dose:  999 mls/hr


Levofloxacin/Dextrose 750 mg/ (Premix)  150 mls @ 100 mls/hr IV ONETIME ONE


   Stop: 12/31/17 18:26


   Last Admin: 12/31/17 17:13 Dose:  100 mls/hr


Sodium Chloride (Normal Saline)  500 mls @ 500 mls/hr IV .BOLUS [facility]


   Last Admin: 12/31/17 22:18 Dose:  500 mls/hr


Meropenem 1 gm/ Sodium (Chloride)  100 mls @ 200 mls/hr IV Q8H [facility]


   Last Admin: 01/01/18 23:44 Dose:  Not Given


Ibuprofen (Motrin)  200 mg PO ONETIME ONE


   Stop: 12/31/17 14:22


   Last Admin: 12/31/17 14:34 Dose:  Not Given


Ibuprofen (Motrin)  400 mg PO ONETIME ONE


   Stop: 12/31/17 14:35


   Last Admin: 12/31/17 15:03 Dose:  400 mg


Ibuprofen (Motrin) Confirm Administered Dose 400 mg .ROUTE .STK-MED ONE


   Stop: 12/31/17 14:34


   Last Admin: 12/31/17 15:02 Dose:  Not Given











- Exam


Quality Assessment: Supplemental Oxygen, DVT Prophylaxis


General: Alert, Oriented, Cooperative, No Acute Distress


HEENT: Pupils Equal, Mucous Membr. Moist/Pink


Neck: Supple


Lungs: Normal Respiratory Effort, Crackles (fine crackles to R lung base).  No: 

Rhonchi, Wheezing


Cardiovascular: Regular Rate, Regular Rhythm, No Murmurs


GI/Abdominal Exam: Normal Bowel Sounds, Soft, Non-Tender, No Organomegaly, No 

Distention, No Abnormal Bruit, No Mass, Pelvis Stable


Back Exam: Normal Inspection, Full Range of Motion


Extremities: Normal Inspection, Normal Range of Motion, Non-Tender, No Pedal 

Edema, Normal Capillary Refill


Neurological: No New Focal Deficit


Psy/Mental Status: Alert, Normal Affect, Normal Mood





- Problem List & Annotations


(1) Community acquired bacterial pneumonia


SNOMED Code(s): 230284520


   Code(s): J15.9 - UNSPECIFIED BACTERIAL PNEUMONIA   Status: Acute   Current 

Visit: Yes   





(2) UTI (urinary tract infection)


SNOMED Code(s): 78363120


   Code(s): N39.0 - URINARY TRACT INFECTION, SITE NOT SPECIFIED   Status: Acute

   Current Visit: Yes   





(3) Positive blood culture


SNOMED Code(s): 585100762


   Code(s): R78.81 - BACTEREMIA   Status: Acute   Current Visit: Yes   





(4) General weakness


SNOMED Code(s): 89537445


   Code(s): R53.1 - WEAKNESS   Status: Acute   Current Visit: Yes   





(5) EDWARD (acute kidney injury)


SNOMED Code(s): 17917613


   Code(s): N17.9 - ACUTE KIDNEY FAILURE, UNSPECIFIED   Status: Acute   Current 

Visit: Yes   





(6) Hx of lymphoma


SNOMED Code(s): 489415376


   Code(s): Z85.72 - PERSONAL HISTORY OF NON-HODGKIN LYMPHOMAS   Status: 

Chronic   Current Visit: Yes   





(7) History of recurrent UTIs


SNOMED Code(s): 444387460


   Code(s): Z87.440 - PERSONAL HISTORY OF URINARY (TRACT) INFECTIONS   Status: 

Chronic   Current Visit: Yes   





(8) Hx of rheumatoid arthritis


SNOMED Code(s): 418085334


   Code(s): Z87.39 - PERSONAL HISTORY OF DISEASES OF THE MS SYS AND CONN TISS   

Status: Chronic   Current Visit: Yes   





(9) Celiac disease


SNOMED Code(s): 920079115


   Code(s): K90.0 - CELIAC DISEASE   Status: Chronic   Current Visit: Yes   





- Problem List Review


Problem List Initiated/Reviewed/Updated: Yes





- My Orders


Last 24 Hours: 


My Active Orders





01/02/18 09:09


Potassium Chloride [Klor-Con M20]   40 meq PO ONETIME ONE 














- Plan


Plan:: 





89 yo female admitted for UTI and possible pneumonia.  





1. Pneumonia: Improving. Chest CT reveals R LLL pneumonia, "questionable 

aspiration due to retained secretions in origin of R lower lobe bronchus" per 

radiologist. Alos reveals, small bilateral pleural effusions. Antibiotic 

coverage expanded due to worsening leukocytosis and positive blood cultures. 

Today culture results corrected, no growth noted in anaerobic bottles. Aerobic 

bottles returned this morning with Strep pneumoniae. Leukocytosis improved to 13

,230. Continue Meropenem, Levaquin, and Vancomycin for now, may consider de-

escalating tomorrow. Does report dysphagia since having radiation and 

chemotherapy, throat feels very dry most days and makes it difficult to 

swallow. Will order ST evaluation. 





2. UTI: Improving. UC returned with Mixed mariana >100,000. Continue antibiotics 

as above. Urinating well.





3. EDWARD: Improving, BUN 35 and Cr 1.4 today. Baseline appears 1.2-1.6. Will 

monitor. 





4. Generalized weakness: Likely secondary to acute illness, but will order PT 

to evaluate and treat to keep strength. Encourage OOB to chair for all meals 

and ambulate with nursing. 





VTE prophylaxis: Heparin





Dispo: 2-3 days pending improvement

## 2018-01-02 NOTE — CR
EXAM DATE: 17



PATIENT'S AGE: 88



Patient: ERIKA FISCHER



Facility: [place], [place]

Patient ID: [id number]

Site Patient ID: [id number].

Site Accession #: [id number]

: 1929

Study: XRay Chest [id number]/31/2017 3:32:27 PM

Ordering Physician: Julio Mosley



Final Report: 

CHEST 1 VIEW AP



INDICATION:

Chest pain. Cough. Shortness of breath. 



COMPARISON:

2015. 



IMPRESSION:

Stable heart size and vascular pattern.

Lungs are clear of new focal opacities.

No pneumothorax or pleural abnormality.

Interstitial markings are mildly increased in overall prominence. No dense 
consolidation. Stable heart size.

There is a fragment of a previous retained central venous catheter which is in 
stable position with the distal aspect in the mid superior vena cava and 
catheter in the innominate vein. No pleural effusion or pneumothorax.



Dictated by Steve Leal MD @ Dec 31 2017 3:39PM

(Electronic Signature)



Report Signed by Proxy.
HAIM

## 2018-01-03 LAB
CHLORIDE SERPL-SCNC: 116 MMOL/L (ref 98–110)
SODIUM SERPL-SCNC: 138 MMOL/L (ref 136–146)

## 2018-01-03 RX ADMIN — Medication SCH DROP: at 12:05

## 2018-01-03 RX ADMIN — Medication SCH DROP: at 20:31

## 2018-01-03 RX ADMIN — HYPROMELLOSES AND CARBOXYMETHYLCELLULOSE SODIUM SCH APPLIC: 3; 2.5 GEL OPHTHALMIC at 20:31

## 2018-01-03 RX ADMIN — FLUTICASONE PROPIONATE SCH SPRAY: 50 SPRAY, METERED NASAL at 08:14

## 2018-01-03 RX ADMIN — CLOTRIMAZOLE SCH EACH: 10 LOZENGE ORAL; TOPICAL at 12:05

## 2018-01-03 RX ADMIN — DEXTROSE SCH UNITS: 10 SOLUTION INTRAVENOUS at 08:13

## 2018-01-03 RX ADMIN — Medication SCH DROP: at 08:20

## 2018-01-03 RX ADMIN — Medication SCH DROP: at 16:21

## 2018-01-03 RX ADMIN — POTASSIUM CHLORIDE SCH MEQ: 1.5 SOLUTION ORAL at 08:13

## 2018-01-03 RX ADMIN — DEXTROSE SCH UNITS: 10 SOLUTION INTRAVENOUS at 20:30

## 2018-01-03 NOTE — PCM.PN
- General Info


Date of Service: 01/03/18


Admission Dx/Problem (Free Text): 


 Admission Diagnosis/Problem





Admission Diagnosis/Problem      UTI, Urinary tract infectious disease








Subjective Update: 





Sitting in chair eating breakfast and visiting with Daughter. Appears perkier 

than previous day. Reports she is feeling improved today. Cough is improving, 

was able to provide sample. Denies chest pain or SOB. Energy is coming back. 


Functional Status: Reports: Pain Controlled, Tolerating Diet, Ambulating, 

Urinating





- Review of Systems


General: Reports: Malaise (but improving. ).  Denies: Fever


Pulmonary: Reports: Cough, Sputum (clear to yellow).  Denies: Shortness of 

Breath


Cardiovascular: Reports: No Symptoms.  Denies: Chest Pain


Gastrointestinal: Reports: No Symptoms.  Denies: Abdominal Pain, Nausea, 

Vomiting


Genitourinary: Reports: No Symptoms.  Denies: Dysuria, Frequency, Burning, Pain


Neurological: Reports: No Symptoms.  Denies: Confusion


Psychiatric: Reports: No Symptoms.  Denies: Confusion





- Patient Data


Vitals - Most Recent: 


 Last Vital Signs











Temp  97.1 F   01/03/18 04:00


 


Pulse  63   01/03/18 04:00


 


Resp  16   01/03/18 04:00


 


BP  125/55 L  01/03/18 04:00


 


Pulse Ox  96   01/03/18 04:00











Weight - Most Recent: 39.5 kg


I&O - Last 24 Hours: 


 Intake & Output











 01/02/18 01/03/18 01/03/18





 22:59 06:59 14:59


 


Intake Total 450 450 


 


Output Total 400 450 


 


Balance 50 0 











Lab Results Last 24 Hours: 


 Laboratory Results - last 24 hr











  01/03/18 01/03/18 Range/Units





  05:43 05:43 


 


WBC  8.63   (4.0-11.0)  K/uL


 


RBC  3.02 L   (4.30-5.90)  M/uL


 


Hgb  9.2 L   (12.0-16.0)  g/dL


 


Hct  27.5 L   (36.0-46.0)  %


 


MCV  91.1   (80.0-98.0)  fL


 


MCH  30.5   (27.0-32.0)  pg


 


MCHC  33.5   (31.0-37.0)  g/dL


 


RDW Std Deviation  50.5   (28.0-62.0)  fl


 


RDW Coeff of Brennon  15   (11.0-15.0)  %


 


Plt Count  181   (150-400)  K/uL


 


MPV  9.10   (7.40-12.00)  fL


 


Neut % (Auto)  73.8   (48.0-80.0)  %


 


Lymph % (Auto)  19.1   (16.0-40.0)  %


 


Mono % (Auto)  3.8   (0.0-15.0)  %


 


Eos % (Auto)  3.2   (0.0-7.0)  %


 


Baso % (Auto)  0.1   (0.0-1.5)  %


 


Neut # (Auto)  6.4 H   (1.4-5.7)  K/uL


 


Lymph # (Auto)  1.7   (0.6-2.4)  K/uL


 


Mono # (Auto)  0.3   (0.0-0.8)  K/uL


 


Eos # (Auto)  0.3   (0.0-0.7)  K/uL


 


Baso # (Auto)  0.0   (0.0-0.1)  K/uL


 


Nucleated RBC %  0.0   /100WBC


 


Nucleated RBCs #  0   K/uL


 


Sodium   138  (136-146)  mmol/L


 


Potassium   4.4  (3.5-5.1)  mmol/L


 


Chloride   116 H  ()  mmol/L


 


Carbon Dioxide   14 L  (21-31)  mmol/L


 


BUN   36 H  (6.0-23.0)  mg/dL


 


Creatinine   1.3  (0.6-1.5)  mg/dL


 


Est Cr Clr Drug Dosing   18.65  mL/min


 


Estimated GFR (MDRD)   38.7  ml/min


 


Glucose   69  ()  mg/dL


 


Calcium   8.5 L  (8.8-10.8)  mg/dL


 


Magnesium   2.3  (1.5-2.3)  mEq/L











Sean Results Last 24 Hours: 


 Microbiology











 01/01/18 19:02 Aerobic Blood Culture - Preliminary





 Blood    NO GROWTH AFTER 1 DAY





 Anaerobic Blood Culture - Preliminary





    NO GROWTH AFTER 1 DAY


 


 01/02/18 15:50 Stool Occult Blood (SEAN) - Final





 Stool / Feces    NEGATIVE OCCULT BLOOD











Med Orders - Current: 


 Current Medications





Albuterol/Ipratropium (Duoneb 3.0-0.5 Mg/3 Ml)  3 ml NEB Q4HRRT PRN


   PRN Reason: Shortness Of Breath/wheezing


Artificial Tears (Refresh Plus 0.5%)  0 each EYEBOTH 0900,1200,1600,2100 [facility]


   Last Admin: 01/03/18 08:20 Dose:  1 drop


Carboxymethylcellu Sod/Hypromellose (Genteal Moderate To Severe Ophth Gel)  0 

ml EYELF BEDTIME [facility]


   Last Admin: 01/02/18 21:38 Dose:  1 applic


Fluticasone Propionate (Flonase)  0 gm NASBOTH DAILY [facility]


   Last Admin: 01/03/18 08:14 Dose:  1 spray


Heparin Sodium (Porcine) (Heparin Sodium)  5,000 units SUBCUT Q12HR [facility]


   Last Admin: 01/03/18 08:13 Dose:  5,000 units


Levofloxacin/Dextrose 250 mg/ (Premix)  50 mls @ 100 mls/hr IV Q48H [facility]


   Last Admin: 01/02/18 16:34 Dose:  100 mls/hr


Levothyroxine Sodium (Levothyroxine)  37.5 mcg PO DAILY@0700 [facility]


   Last Admin: 01/03/18 06:14 Dose:  37.5 mcg


Magnesium Oxide (Magnesium Oxide)  400 mg PO BID [facility]


   Last Admin: 01/03/18 08:13 Dose:  400 mg


Naproxen (Naproxen Sodium)  110 mg PO BEDTIME PRN


   PRN Reason: Pain


   Last Admin: 01/02/18 22:11 Dose:  110 mg


Ondansetron HCl (Zofran)  4 mg IVPUSH Q4H PRN


   PRN Reason: Nausea


Dulera Oral Inh 100/ (5  **Own Med**)  0 each INH DAILY@1200 [facility]


   Last Admin: 01/02/18 12:53 Dose:  1 each


Potassium Chloride (Potassium Chloride)  40 meq PO DAILY [facility]


   Last Admin: 01/03/18 08:13 Dose:  40 meq


Sodium Chloride (Saline Flush)  10 ml FLUSH ASDIRECTED PRN


   PRN Reason: Keep Vein Open


   Last Admin: 12/31/17 13:49 Dose:  10 ml


Sodium Chloride (Saline Flush)  2.5 ml FLUSH ASDIRECTED PRN


   PRN Reason: Keep Vein Open


   Last Admin: 12/31/17 13:49 Dose:  2.5 ml





Discontinued Medications





Acetaminophen (Tylenol Extra Strength)  1,000 mg PO ONETIME ONE


   Stop: 12/31/17 14:15


   Last Admin: 12/31/17 14:34 Dose:  Not Given


Albuterol/Ipratropium (Duoneb 3.0-0.5 Mg/3 Ml)  3 ml NEB ONETIME ONE


   Stop: 12/31/17 13:27


   Last Admin: 12/31/17 13:33 Dose:  3 ml


Artificial Tears (Refresh Plus 0.5%)  0 each EYEBOTH TID [facility]


   Last Admin: 01/02/18 13:01 Dose:  1 drop


Sodium Chloride (Normal Saline)  1,000 mls @ 125 mls/hr IV STAT [facility]


   Last Infusion: 12/31/17 14:35 Dose:  999 mls/hr


Sodium Chloride (Normal Saline)  1,000 mls @ 999 mls/hr IV .Bolus ONE


   Stop: 12/31/17 17:51


   Last Admin: 12/31/17 17:13 Dose:  999 mls/hr


Levofloxacin/Dextrose 750 mg/ (Premix)  150 mls @ 100 mls/hr IV ONETIME ONE


   Stop: 12/31/17 18:26


   Last Admin: 12/31/17 17:13 Dose:  100 mls/hr


Sodium Chloride (Normal Saline)  1,000 mls @ 75 mls/hr IV ASDIRECTED [facility]


   Last Admin: 01/02/18 05:48 Dose:  75 mls/hr


Sodium Chloride (Normal Saline)  500 mls @ 500 mls/hr IV .BOLUS [facility]


   Last Admin: 12/31/17 22:18 Dose:  500 mls/hr


Meropenem 1 gm/ Sodium (Chloride)  100 mls @ 200 mls/hr IV Q8H [facility]


   Last Admin: 01/01/18 23:44 Dose:  Not Given


Meropenem 500 mg/ Sodium (Chloride)  50 mls @ 100 mls/hr IV Q12H [facility]


   Last Admin: 01/02/18 01:19 Dose:  100 mls/hr


Vancomycin HCl 500 mg/ Sodium (Chloride)  100 mls @ 100 mls/hr IV Q24H [facility]


   Last Admin: 01/01/18 18:27 Dose:  100 mls/hr


Magnesium Sulfate 4 gm/ Premix  100 mls @ 50 mls/hr IV ONETIME ONE


   Stop: 01/02/18 14:20


   Last Admin: 01/02/18 12:53 Dose:  50 mls/hr


Ibuprofen (Motrin)  200 mg PO ONETIME ONE


   Stop: 12/31/17 14:22


   Last Admin: 12/31/17 14:34 Dose:  Not Given


Ibuprofen (Motrin)  400 mg PO ONETIME ONE


   Stop: 12/31/17 14:35


   Last Admin: 12/31/17 15:03 Dose:  400 mg


Ibuprofen (Motrin) Confirm Administered Dose 400 mg .ROUTE .STK-MED ONE


   Stop: 12/31/17 14:34


   Last Admin: 12/31/17 15:02 Dose:  Not Given


Potassium Chloride (Klor-Con M20)  40 meq PO ONETIME ONE


   Stop: 01/02/18 09:10


   Last Admin: 01/02/18 10:23 Dose:  Not Given


Potassium Chloride (Potassium Chloride)  40 meq PO ONETIME ONE


   Stop: 01/02/18 09:42


   Last Admin: 01/02/18 10:10 Dose:  40 meq


Vancomycin HCl (Pharmacy To Dose - Vancomycin)  1 dose .XX ASDIRECTED ABBEY











- Exam


Quality Assessment: Supplemental Oxygen, DVT Prophylaxis


General: Alert, Oriented, Cooperative, No Acute Distress


HEENT: Pupils Equal, Mucous Membr. Moist/Pink


Neck: Supple


Lungs: Clear to Auscultation, Normal Respiratory Effort


Cardiovascular: Regular Rate, Regular Rhythm, No Murmurs


GI/Abdominal Exam: Normal Bowel Sounds, Soft, Non-Tender, No Organomegaly, No 

Distention, No Abnormal Bruit, No Mass, Pelvis Stable


Extremities: Normal Inspection, Normal Range of Motion, Non-Tender, No Pedal 

Edema, Normal Capillary Refill


Neurological: No New Focal Deficit


Psy/Mental Status: Alert, Normal Affect, Normal Mood





- Problem List & Annotations


(1) Community acquired bacterial pneumonia


SNOMED Code(s): 903329115


   Code(s): J15.9 - UNSPECIFIED BACTERIAL PNEUMONIA   Status: Acute   Current 

Visit: Yes   





(2) UTI (urinary tract infection)


SNOMED Code(s): 90992151


   Code(s): N39.0 - URINARY TRACT INFECTION, SITE NOT SPECIFIED   Status: Acute

   Current Visit: Yes   





(3) Positive blood culture


SNOMED Code(s): 321465948


   Code(s): R78.81 - BACTEREMIA   Status: Acute   Current Visit: Yes   





(4) General weakness


SNOMED Code(s): 54413114


   Code(s): R53.1 - WEAKNESS   Status: Acute   Current Visit: Yes   





(5) EDWARD (acute kidney injury)


SNOMED Code(s): 61947189


   Code(s): N17.9 - ACUTE KIDNEY FAILURE, UNSPECIFIED   Status: Acute   Current 

Visit: Yes   





(6) Hx of lymphoma


SNOMED Code(s): 416153191


   Code(s): Z85.72 - PERSONAL HISTORY OF NON-HODGKIN LYMPHOMAS   Status: 

Chronic   Current Visit: Yes   





(7) History of recurrent UTIs


SNOMED Code(s): 078643394


   Code(s): Z87.440 - PERSONAL HISTORY OF URINARY (TRACT) INFECTIONS   Status: 

Chronic   Current Visit: Yes   





(8) Hx of rheumatoid arthritis


SNOMED Code(s): 630411595


   Code(s): Z87.39 - PERSONAL HISTORY OF DISEASES OF THE MS SYS AND CONN TISS   

Status: Chronic   Current Visit: Yes   





(9) Celiac disease


SNOMED Code(s): 563816493


   Code(s): K90.0 - CELIAC DISEASE   Status: Chronic   Current Visit: Yes   





- Problem List Review


Problem List Initiated/Reviewed/Updated: Yes





- My Orders


Last 24 Hours: 


My Active Orders





01/02/18 09:41


Consult to Speech Language Pathology [SLP Evaluation and Treatment] [CONS] 

Routine 





01/02/18 12:21


Modified Barium Swallow Study [Swallowing Function w Video] [CR] Routine 





01/02/18 12:22


Consult to Physical Therapy [PT Evaluation and Treatment] [CONS] Routine 





01/02/18 21:00


Magnesium Oxide   400 mg PO BID 





01/03/18 09:00


Potassium Chloride   40 meq PO DAILY 





01/04/18 05:11


BASIC METABOLIC PANEL,BMP [CHEM] AM 


CBC WITH AUTO DIFF [HEME] AM 


MAGNESIUM [CHEM] AM 





01/05/18 05:11


BASIC METABOLIC PANEL,BMP [CHEM] AM 


CBC WITH AUTO DIFF [HEME] AM 


MAGNESIUM [CHEM] AM 














- Plan


Plan:: 





89 yo female admitted for UTI and possible pneumonia.  





1. Pneumonia: Continues to improve, lung sounds clear today.  Chest CT revealed 

R LLL pneumonia, possible aspiration. Aerobic blood cultures returned with 

Strep pneumoniae, all antibiotics, except Levaquin, stopped. Leukocytosis 

continues to improve today,  [phone]. ST evaluation and obtained modified barium 

swallow study, which should penetration on most textures and positions. No 

aspiration noted. Encouraged Swallowing exercises and honey thick liquids with 

soft textures in small bites and sips. Attempt to wean O2 today. Wears 2 L NC 

at bedtime at home.  





2. UTI: Improving. UC returned with Mixed mariana >100,000. Continue Levaquin





3. EDWARD: Resolved. Appears to be at baseline. Will continue to monitor 





4. Generalized weakness: Improving.  Likely secondary to acute illness, but 

will order PT to evaluate and treat to keep strength. Encourage OOB to chair 

for all meals and ambulate with nursing. 





VTE prophylaxis: Heparin





Dispo: 2-3 days pending improvement. Explained discharge may be in next couple 

days due to improvement. Daughter and patient are aware and ok with this.

## 2018-01-04 LAB
CHLORIDE SERPL-SCNC: 114 MMOL/L (ref 98–110)
SODIUM SERPL-SCNC: 137 MMOL/L (ref 136–146)

## 2018-01-04 RX ADMIN — Medication SCH DROP: at 11:39

## 2018-01-04 RX ADMIN — DEXTROSE SCH UNITS: 10 SOLUTION INTRAVENOUS at 20:57

## 2018-01-04 RX ADMIN — CLOTRIMAZOLE SCH EACH: 10 LOZENGE ORAL; TOPICAL at 11:41

## 2018-01-04 RX ADMIN — HYPROMELLOSES AND CARBOXYMETHYLCELLULOSE SODIUM SCH APPLIC: 3; 2.5 GEL OPHTHALMIC at 20:56

## 2018-01-04 RX ADMIN — FLUTICASONE PROPIONATE SCH SPRAY: 50 SPRAY, METERED NASAL at 09:04

## 2018-01-04 RX ADMIN — DEXTROSE SCH UNITS: 10 SOLUTION INTRAVENOUS at 09:02

## 2018-01-04 RX ADMIN — LEVOFLOXACIN SCH MLS/HR: 5 INJECTION, SOLUTION INTRAVENOUS at 16:22

## 2018-01-04 RX ADMIN — Medication SCH DROP: at 15:46

## 2018-01-04 RX ADMIN — POTASSIUM CHLORIDE SCH MEQ: 1.5 SOLUTION ORAL at 08:59

## 2018-01-04 RX ADMIN — Medication SCH DROP: at 20:56

## 2018-01-04 RX ADMIN — Medication SCH DROP: at 09:06

## 2018-01-04 NOTE — PCM.PN
- General Info


Date of Service: 01/04/18


Admission Dx/Problem (Free Text): 


 Admission Diagnosis/Problem





Admission Diagnosis/Problem      UTI, Urinary tract infectious disease








Subjective Update: 





Weaned off oxygen today, feeling better. Reports not feeling strong enough and 

is worried about getting around at her apartment, where she has very little 

help. She denies chest pain or SOB. Had a coughing episode while eating eggs 

this morning, reports it went down the wrong tube. Discussed with her the 

recommendations of the Speech therapist and she really doesn't want to change 

her diet from what it is currently. She reports being limited so much already 

she doesn't want to limit it more and thicken her liquids. 


Functional Status: Reports: Pain Controlled, Tolerating Diet, Ambulating (has 

not been ambulating in hallway yet.), Urinating





- Review of Systems


General: Reports: Weakness (generalized.).  Denies: Fever


HEENT: Reports: No Symptoms.  Denies: Ear Pain, Headaches, Sore Throat, Visual 

Changes


Pulmonary: Reports: Cough.  Denies: Shortness of Breath, Sputum, Wheezing


Cardiovascular: Reports: No Symptoms.  Denies: Chest Pain, Edema, 

Lightheadedness


Gastrointestinal: Reports: No Symptoms.  Denies: Abdominal Pain, Nausea, 

Vomiting


Genitourinary: Reports: No Symptoms.  Denies: Dysuria, Frequency, Burning


Musculoskeletal: Reports: No Symptoms


Neurological: Reports: No Symptoms.  Denies: Confusion


Psychiatric: Reports: No Symptoms.  Denies: Confusion





- Patient Data


Vitals - Most Recent: 


 Last Vital Signs











Temp  96.3 F   01/04/18 08:00


 


Pulse  59 L  01/04/18 08:00


 


Resp  22 H  01/04/18 08:00


 


BP  102/45 L  01/04/18 08:00


 


Pulse Ox  93 L  01/04/18 08:00











Weight - Most Recent: 39.5 kg


I&O - Last 24 Hours: 


 Intake & Output











 01/03/18 01/04/18 01/04/18





 22:59 06:59 14:59


 


Intake Total 750 360 


 


Output Total 300 700 


 


Balance 450 -340 











Lab Results Last 24 Hours: 


 Laboratory Results - last 24 hr











  01/04/18 01/04/18 Range/Units





  04:58 04:58 


 


WBC  5.89   (4.0-11.0)  K/uL


 


RBC  3.06 L   (4.30-5.90)  M/uL


 


Hgb  9.2 L   (12.0-16.0)  g/dL


 


Hct  27.8 L   (36.0-46.0)  %


 


MCV  90.8   (80.0-98.0)  fL


 


MCH  30.1   (27.0-32.0)  pg


 


MCHC  33.1   (31.0-37.0)  g/dL


 


RDW Std Deviation  50.5   (28.0-62.0)  fl


 


RDW Coeff of Brennon  15   (11.0-15.0)  %


 


Plt Count  188   (150-400)  K/uL


 


MPV  9.00   (7.40-12.00)  fL


 


Neut % (Auto)  64.1   (48.0-80.0)  %


 


Lymph % (Auto)  23.1   (16.0-40.0)  %


 


Mono % (Auto)  7.3   (0.0-15.0)  %


 


Eos % (Auto)  5.3   (0.0-7.0)  %


 


Baso % (Auto)  0.2   (0.0-1.5)  %


 


Neut # (Auto)  3.8   (1.4-5.7)  K/uL


 


Lymph # (Auto)  1.4   (0.6-2.4)  K/uL


 


Mono # (Auto)  0.4   (0.0-0.8)  K/uL


 


Eos # (Auto)  0.3   (0.0-0.7)  K/uL


 


Baso # (Auto)  0.0   (0.0-0.1)  K/uL


 


Nucleated RBC %  0.0   /100WBC


 


Nucleated RBCs #  0   K/uL


 


Sodium   137  (136-146)  mmol/L


 


Potassium   4.5  (3.5-5.1)  mmol/L


 


Chloride   114 H  ()  mmol/L


 


Carbon Dioxide   16 L  (21-31)  mmol/L


 


BUN   34 H  (6.0-23.0)  mg/dL


 


Creatinine   1.3  (0.6-1.5)  mg/dL


 


Est Cr Clr Drug Dosing   18.65  mL/min


 


Estimated GFR (MDRD)   38.7  ml/min


 


Glucose   68  ()  mg/dL


 


Calcium   8.5 L  (8.8-10.8)  mg/dL


 


Magnesium   1.9  (1.5-2.3)  mEq/L











Sean Results Last 24 Hours: 


 Microbiology











 01/01/18 19:02 Aerobic Blood Culture - Preliminary





 Blood    NO GROWTH AFTER 2 DAYS





 Anaerobic Blood Culture - Preliminary





    NO GROWTH AFTER 2 DAYS











Med Orders - Current: 


 Current Medications





Albuterol/Ipratropium (Duoneb 3.0-0.5 Mg/3 Ml)  3 ml NEB Q4HRRT PRN


   PRN Reason: Shortness Of Breath/wheezing


Artificial Tears (Refresh Plus 0.5%)  0 each EYEBOTH 0900,1200,1600,2100 [facility]


   Last Admin: 01/04/18 09:06 Dose:  1 drop


Carboxymethylcellu Sod/Hypromellose (Genteal Moderate To Severe Ophth Gel)  0 

ml EYELF BEDTIME [facility]


   Last Admin: 01/03/18 20:31 Dose:  1 applic


Fluticasone Propionate (Flonase)  0 gm NASBOTH DAILY [facility]


   Last Admin: 01/04/18 09:04 Dose:  1 spray


Heparin Sodium (Porcine) (Heparin Sodium)  5,000 units SUBCUT Q12HR [facility]


   Last Admin: 01/04/18 09:02 Dose:  5,000 units


Levofloxacin/Dextrose 250 mg/ (Premix)  50 mls @ 100 mls/hr IV Q48H [facility]


   Last Admin: 01/02/18 16:34 Dose:  100 mls/hr


Levothyroxine Sodium (Levothyroxine)  37.5 mcg PO DAILY@0700 [facility]


   Last Admin: 01/04/18 06:45 Dose:  37.5 mcg


Magnesium Oxide (Magnesium Oxide)  400 mg PO BID [facility]


   Last Admin: 01/04/18 08:59 Dose:  400 mg


Naproxen (Naproxen Sodium)  110 mg PO BEDTIME PRN


   PRN Reason: Pain


   Last Admin: 01/03/18 20:32 Dose:  110 mg


Ondansetron HCl (Zofran)  4 mg IVPUSH Q4H PRN


   PRN Reason: Nausea


Dulera Oral Inh 100/ (5  **Own Med**)  0 each INH DAILY@1200 [facility]


   Last Admin: 01/03/18 12:05 Dose:  1 each


Potassium Chloride (Potassium Chloride)  40 meq PO DAILY [facility]


   Last Admin: 01/04/18 08:59 Dose:  40 meq


Sodium Chloride (Saline Flush)  10 ml FLUSH ASDIRECTED PRN


   PRN Reason: Keep Vein Open


   Last Admin: 12/31/17 13:49 Dose:  10 ml


Sodium Chloride (Saline Flush)  2.5 ml FLUSH ASDIRECTED PRN


   PRN Reason: Keep Vein Open


   Last Admin: 12/31/17 13:49 Dose:  2.5 ml





Discontinued Medications





Acetaminophen (Tylenol Extra Strength)  1,000 mg PO ONETIME ONE


   Stop: 12/31/17 14:15


   Last Admin: 12/31/17 14:34 Dose:  Not Given


Albuterol/Ipratropium (Duoneb 3.0-0.5 Mg/3 Ml)  3 ml NEB ONETIME ONE


   Stop: 12/31/17 13:27


   Last Admin: 12/31/17 13:33 Dose:  3 ml


Artificial Tears (Refresh Plus 0.5%)  0 each EYEBOTH TID [facility]


   Last Admin: 01/02/18 13:01 Dose:  1 drop


Sodium Chloride (Normal Saline)  1,000 mls @ 125 mls/hr IV STAT [facility]


   Last Infusion: 12/31/17 14:35 Dose:  999 mls/hr


Sodium Chloride (Normal Saline)  1,000 mls @ 999 mls/hr IV .Bolus ONE


   Stop: 12/31/17 17:51


   Last Admin: 12/31/17 17:13 Dose:  999 mls/hr


Levofloxacin/Dextrose 750 mg/ (Premix)  150 mls @ 100 mls/hr IV ONETIME ONE


   Stop: 12/31/17 18:26


   Last Admin: 12/31/17 17:13 Dose:  100 mls/hr


Sodium Chloride (Normal Saline)  1,000 mls @ 75 mls/hr IV ASDIRECTED [facility]


   Last Admin: 01/02/18 05:48 Dose:  75 mls/hr


Sodium Chloride (Normal Saline)  500 mls @ 500 mls/hr IV .BOLUS [facility]


   Last Admin: 12/31/17 22:18 Dose:  500 mls/hr


Meropenem 1 gm/ Sodium (Chloride)  100 mls @ 200 mls/hr IV Q8H [facility]


   Last Admin: 01/01/18 23:44 Dose:  Not Given


Meropenem 500 mg/ Sodium (Chloride)  50 mls @ 100 mls/hr IV Q12H [facility]


   Last Admin: 01/02/18 01:19 Dose:  100 mls/hr


Vancomycin HCl 500 mg/ Sodium (Chloride)  100 mls @ 100 mls/hr IV Q24H [facility]


   Last Admin: 01/01/18 18:27 Dose:  100 mls/hr


Magnesium Sulfate 4 gm/ Premix  100 mls @ 50 mls/hr IV ONETIME ONE


   Stop: 01/02/18 14:20


   Last Admin: 01/02/18 12:53 Dose:  50 mls/hr


Ibuprofen (Motrin)  200 mg PO ONETIME ONE


   Stop: 12/31/17 14:22


   Last Admin: 12/31/17 14:34 Dose:  Not Given


Ibuprofen (Motrin)  400 mg PO ONETIME ONE


   Stop: 12/31/17 14:35


   Last Admin: 12/31/17 15:03 Dose:  400 mg


Ibuprofen (Motrin) Confirm Administered Dose 400 mg .ROUTE .STK-MED ONE


   Stop: 12/31/17 14:34


   Last Admin: 12/31/17 15:02 Dose:  Not Given


Potassium Chloride (Klor-Con M20)  40 meq PO ONETIME ONE


   Stop: 01/02/18 09:10


   Last Admin: 01/02/18 10:23 Dose:  Not Given


Potassium Chloride (Potassium Chloride)  40 meq PO ONETIME ONE


   Stop: 01/02/18 09:42


   Last Admin: 01/02/18 10:10 Dose:  40 meq


Vancomycin HCl (Pharmacy To Dose - Vancomycin)  1 dose .XX ASDIRECTED ABBEY











- Exam


General: Alert, Oriented, Cooperative, No Acute Distress


Neck: Supple


Lungs: Clear to Auscultation, Normal Respiratory Effort, Other (was coughing 

heavily when I walked into room, reports swallowing eggs wrong. )


Cardiovascular: Regular Rate, Regular Rhythm


GI/Abdominal Exam: Normal Bowel Sounds, Soft, Non-Tender, No Organomegaly, No 

Distention, No Abnormal Bruit, No Mass, Pelvis Stable


Extremities: Normal Inspection, Normal Range of Motion, Non-Tender, No Pedal 

Edema, Normal Capillary Refill


Skin: Warm, Dry, Intact


Neurological: No New Focal Deficit


Psy/Mental Status: Alert, Normal Affect, Normal Mood





- Problem List & Annotations


(1) Community acquired bacterial pneumonia


SNOMED Code(s): 010831059


   Code(s): J15.9 - UNSPECIFIED BACTERIAL PNEUMONIA   Status: Acute   Current 

Visit: Yes   





(2) UTI (urinary tract infection)


SNOMED Code(s): 00404505


   Code(s): N39.0 - URINARY TRACT INFECTION, SITE NOT SPECIFIED   Status: Acute

   Current Visit: Yes   





(3) Positive blood culture


SNOMED Code(s): 324195240


   Code(s): R78.81 - BACTEREMIA   Status: Acute   Current Visit: Yes   





(4) General weakness


SNOMED Code(s): 13373812


   Code(s): R53.1 - WEAKNESS   Status: Acute   Current Visit: Yes   





(5) EDWARD (acute kidney injury)


SNOMED Code(s): 77436612


   Code(s): N17.9 - ACUTE KIDNEY FAILURE, UNSPECIFIED   Status: Acute   Current 

Visit: Yes   





(6) Hx of lymphoma


SNOMED Code(s): 938957503


   Code(s): Z85.72 - PERSONAL HISTORY OF NON-HODGKIN LYMPHOMAS   Status: 

Chronic   Current Visit: Yes   





(7) History of recurrent UTIs


SNOMED Code(s): 801073117


   Code(s): Z87.440 - PERSONAL HISTORY OF URINARY (TRACT) INFECTIONS   Status: 

Chronic   Current Visit: Yes   





(8) Hx of rheumatoid arthritis


SNOMED Code(s): 484206645


   Code(s): Z87.39 - PERSONAL HISTORY OF DISEASES OF THE MS SYS AND CONN TISS   

Status: Chronic   Current Visit: Yes   





(9) Celiac disease


SNOMED Code(s): 263479088


   Code(s): K90.0 - CELIAC DISEASE   Status: Chronic   Current Visit: Yes   





- Problem List Review


Problem List Initiated/Reviewed/Updated: Yes





- My Orders


Last 24 Hours: 


My Active Orders





01/03/18 09:00


Potassium Chloride   40 meq PO DAILY 





01/03/18 12:37


Communication Order [RC] PRN 





01/05/18 05:11


BASIC METABOLIC PANEL,BMP [CHEM] AM 


CBC WITH AUTO DIFF [HEME] AM 


MAGNESIUM [CHEM] AM 














- Plan


Plan:: 





87 yo female admitted for UTI and possible pneumonia.  





1. Pneumonia: Continues to show improvement, weaned off oxygen during the day.  

Aerobic blood cultures returned with Strep pneumoniae. Repeat BC negative x 2 

days. Continue Levaquin. Leukocytosis resolved. reporting that she does not 

want to change diet, to include honey thick liquids, because her diet is 

already so limited due to dysphagia. She will keep her diet as if. Encouraged 

Swallowing exercises per ST. Wears 2 L NC at bedtime at home.  





2. UTI: Improving. Continue Levaquin. 





3. Generalized weakness: Improving. But not feeling totally strong enough to 

return to home. Spoke with Oni, social work, who will provide her with [facility] 

application. Possible transfer to [place] tomorrow for short term rehabilitation 

until she is storng enough to return home.  Continue PT  and encouraging OOB to 

chair for all meals and ambulate with nursing.





VTE prophylaxis: Heparin





Dispo: Possible discharge in am to [place]. Dr Liu is her PCP.

## 2018-01-05 VITALS — DIASTOLIC BLOOD PRESSURE: 55 MMHG | SYSTOLIC BLOOD PRESSURE: 90 MMHG

## 2018-01-05 LAB
CHLORIDE SERPL-SCNC: 110 MMOL/L (ref 98–110)
SODIUM SERPL-SCNC: 136 MMOL/L (ref 136–146)

## 2018-01-05 RX ADMIN — DEXTROSE SCH UNITS: 10 SOLUTION INTRAVENOUS at 08:05

## 2018-01-05 RX ADMIN — POTASSIUM CHLORIDE SCH MEQ: 1.5 SOLUTION ORAL at 08:04

## 2018-01-05 RX ADMIN — CLOTRIMAZOLE SCH: 10 LOZENGE ORAL; TOPICAL at 11:36

## 2018-01-05 RX ADMIN — Medication SCH DROP: at 08:17

## 2018-01-05 RX ADMIN — Medication SCH: at 11:36

## 2018-01-05 RX ADMIN — FLUTICASONE PROPIONATE SCH SPRAY: 50 SPRAY, METERED NASAL at 08:04

## 2018-01-05 NOTE — [id number]
**Discharge Summary





- Hospital Course


Brief History: This 88 year old female with pmh of dysphagia seondary to 

chemotherapy and radiation from lymphoma, celiac disease, recurrent UTIs, and 

impair hearing presented to the ED with shortness of breath, productive cough, 

and generalized weakness.  Patient reports having cold with post nasal drip 

several days prior to shortness of breath.  She denies any chest pain.  She was 

noted to have a fever of 39.1.  CXR showed no focal consolidation and UA had +2 

bacteria.





- Discharge Data


Discharge Date: 01/05/18


Discharge Disposition: DC/Tfer to SNF [facility]


Condition: Good





- Discharge Diagnosis/Problem(s)


(1) Community acquired bacterial pneumonia


SNOMED Code(s): 194608725


   ICD Code: J15.9 - UNSPECIFIED BACTERIAL PNEUMONIA   Status: Acute   





(2) UTI (urinary tract infection)


SNOMED Code(s): 10444926


   ICD Code: N39.0 - URINARY TRACT INFECTION, SITE NOT SPECIFIED   Status: 

Acute   





(3) Positive blood culture


SNOMED Code(s): 075335979


   ICD Code: R78.81 - BACTEREMIA   Status: Acute   





(4) General weakness


SNOMED Code(s): 77785646


   ICD Code: R53.1 - WEAKNESS   Status: Acute   





(5) EDWARD (acute kidney injury)


SNOMED Code(s): 41116539


   ICD Code: N17.9 - ACUTE KIDNEY FAILURE, UNSPECIFIED   Status: Acute   





(6) Hx of lymphoma


SNOMED Code(s): 269517907


   ICD Code: Z85.72 - PERSONAL HISTORY OF NON-HODGKIN LYMPHOMAS   Status: 

Chronic   





(7) History of recurrent UTIs


SNOMED Code(s): 806452461


   ICD Code: Z87.440 - PERSONAL HISTORY OF URINARY (TRACT) INFECTIONS   Status: 

Chronic   





(8) Hx of rheumatoid arthritis


SNOMED Code(s): 169703261


   ICD Code: Z87.39 - PERSONAL HISTORY OF DISEASES OF THE MS SYS AND CONN TISS 

  Status: Chronic   





(9) Celiac disease


SNOMED Code(s): 133087419


   ICD Code: K90.0 - CELIAC DISEASE   Status: Chronic   





- Patient Summary/Data


Consults: 


 Consultations





01/02/18 09:41


Consult to Speech Language Pathology [SLP Evaluation and Treatment] [CONS] 

Routine 





01/02/18 12:22


Consult to Physical Therapy [PT Evaluation and Treatment] [CONS] Routine 














- Patient Instructions


Diet, Other: Gluten free, soft diet. small bites/small sips. Boost/Ensure PRN


Activity: As Tolerated


Showering/Bathing: May Shower


Notify Provider of: Fever, Increased Pain, Swelling and Redness, Drainage, 

Nausea and/or Vomiting


Other/Special Instructions: PT/OT/ST to evaluate and treat.  Oxygen 2 L NC at 

nighttime





- Discharge Plan


Prescriptions/Med Rec: 


Levofloxacin [Levaquin] 250 mg PO Q48H #6 tablet


Home Medications: 


 Home Meds





Calcitonin (Sunset) [Miacalcin Nasal Spray] 1 inh INH DAILY 01/21/15 [History]


Levothyroxine Sodium [Synthroid] 37.5 mcg PO DAILY 01/21/15 [History]


Magnesium Oxide 400 mg PO BID 01/21/15 [History]


Multivitamin [Multi-Vitamin Daily] 1 tab PO DAILY 01/21/15 [History]


Potassium Chloride 40 meq PO DAILY 01/21/15 [History]


Lutein/Minerals/Vit A,C & E [Ocuvite] PO DAILY 12/31/17 [History]


Fluticasone Propionate [Flonase] 1 spray NASBOTH DAILY 01/01/18 [History]


Hypromellose [Genteal Severe] 1 drop EYELF BEDTIME 01/01/18 [History]


Mometasone/Formoterol [Dulera 100-5 MCG] 1 puff IH DAILY 01/01/18 [History]


Naproxen Sodium [Aleve] 0.5 tab PO BEDTIME PRN 01/01/18 [History]


Propylene Glycol [Systane Balance] 1 drop EYEBOTH TID 01/01/18 [History]


Levofloxacin [Levaquin] 250 mg PO Q48H #6 tablet 01/05/18 [Rx]








Patient Handouts:  Urinary Tract Infection, Adult, Easy-to-Read, Levofloxacin 

tablets


Referrals: 


[facility] [Outside]


Orlin Liu MD [Physician] - 01/11/18 (Follow on next rosemarie rounds.)





- Discharge Summary/Plan Comment


DC Time >30 min.: No


Discharge Summary/Plan Comment: 





Discharge Diagnoses:





CAP


Suspected aspiration pneumonia


Strep pneumoniae bacteremia


Generalized weaknedd


Deconditioning


Dysphagia


Hx Lymphoma


celiac disease





Samantha was admitted and initially treated for pneumonia. CT of chest abdomen 

and pelvis were obtained due to leukocytosis and no clear source of infection. 

Chest CT revealed R LLL pneumonia, questionable aspiration due to retain 

secretions in origin of R lower lobe bronchus. Antibiotic coverage was expanded 

due to increasing leukocytosis, day 2 of admission and due to positive blood 

cultures return with gram negative juan and gram positive cocci. Upon further 

review by Micro, no gram neg rods were noted and BC returned with Strep 

pneumoniae. Antibiotics coverage was de-escalated to Levaquin along, which 

would cover UTI. UC returned with mixed mariana >100,000. She continued to slowly 

improve. She continued to have some generalized weakness and deconditioning 

from acute illness. ST was consulted due to suspected aspiration. Modified 

barium swallow study was obtained. This showed penetration of all consistencies 

but no aspiration. She was encouraged to complete swallowing exercises and to 

use honey thicken liquids with soft foods. She had declined thickening liquids. 

Today leukocytosis has resolved and WBC 5,900. EDWARD resolved a couple days ago 

and BUN 31, Cr 1.3 which is baseline. She will be sent home on Levaquin 250 mg 

[id number] for 11 more days for penumonia and bacteremia. She will be followed at 

[facility] by Dr Liu, who is PCP and aware of transfer to [facility] for short term 

rehabilitation due to deconditioning. She is to return to ED or clinic if 

concerns should arise. 





- General Info


Date of Service: 01/05/18


Admission Dx/Problem (Free Text: 


 Admission Diagnosis/Problem





Admission Diagnosis/Problem      UTI, Urinary tract infectious disease








Subjective Update: 





Sitting up in chair, visit with daughter. Reports feeling tired today after 

walking so much yesterday. No chest pain or SOB. Cough continues but is 

improving. 


Functional Status: Reports: Pain Controlled, Tolerating Diet, Ambulating, 

Urinating





- Review of Systems


General: Reports: No Symptoms.  Denies: Fever


Pulmonary: Reports: Cough.  Denies: Shortness of Breath, Sputum, Hemoptysis, 

Wheezing


Cardiovascular: Reports: No Symptoms.  Denies: Chest Pain, Palpitations, 

Lightheadedness


Gastrointestinal: Reports: No Symptoms.  Denies: Abdominal Pain, Nausea, 

Vomiting


Genitourinary: Reports: No Symptoms.  Denies: Dysuria, Frequency, Burning, Pain


Neurological: Reports: No Symptoms.  Denies: Confusion, Dizziness


Psychiatric: Reports: No Symptoms.  Denies: Confusion





- Patient Data


Vitals - Most Recent: 


 Last Vital Signs











Temp  98.9 F   01/05/18 04:00


 


Pulse  62   01/05/18 04:00


 


Resp  17   01/05/18 04:00


 


BP  120/58 L  01/05/18 04:00


 


Pulse Ox  97   01/05/18 04:00











Weight - Most Recent: 39.5 kg


I&O - Last 24 hours: 


 Intake & Output











 01/04/18 01/05/18 01/05/18





 22:59 06:59 14:59


 


Intake Total 787 350 


 


Output Total 1100 1585 


 


Balance -313 -1235 











Lab Results - Last 24 hrs: 


 Laboratory Results - last 24 hr











  01/05/18 01/05/18 Range/Units





  05:12 05:12 


 


WBC  5.97   (4.0-11.0)  K/uL


 


RBC  3.13 L   (4.30-5.90)  M/uL


 


Hgb  9.3 L   (12.0-16.0)  g/dL


 


Hct  28.1 L   (36.0-46.0)  %


 


MCV  89.8   (80.0-98.0)  fL


 


MCH  29.7   (27.0-32.0)  pg


 


MCHC  33.1   (31.0-37.0)  g/dL


 


RDW Std Deviation  48.8   (28.0-62.0)  fl


 


RDW Coeff of Brennon  15   (11.0-15.0)  %


 


Plt Count  188   (150-400)  K/uL


 


MPV  9.20   (7.40-12.00)  fL


 


Neut % (Auto)  56.5   (48.0-80.0)  %


 


Lymph % (Auto)  25.8   (16.0-40.0)  %


 


Mono % (Auto)  13.4   (0.0-15.0)  %


 


Eos % (Auto)  4.0   (0.0-7.0)  %


 


Baso % (Auto)  0.3   (0.0-1.5)  %


 


Neut # (Auto)  3.4   (1.4-5.7)  K/uL


 


Lymph # (Auto)  1.5   (0.6-2.4)  K/uL


 


Mono # (Auto)  0.8   (0.0-0.8)  K/uL


 


Eos # (Auto)  0.2   (0.0-0.7)  K/uL


 


Baso # (Auto)  0.0   (0.0-0.1)  K/uL


 


Nucleated RBC %  0.0   /100WBC


 


Nucleated RBCs #  0   K/uL


 


Sodium   136  (136-146)  mmol/L


 


Potassium   4.2  (3.5-5.1)  mmol/L


 


Chloride   110  ()  mmol/L


 


Carbon Dioxide   20 L  (21-31)  mmol/L


 


BUN   31 H  (6.0-23.0)  mg/dL


 


Creatinine   1.3  (0.6-1.5)  mg/dL


 


Est Cr Clr Drug Dosing   18.65  mL/min


 


Estimated GFR (MDRD)   38.7  ml/min


 


Glucose   76  ()  mg/dL


 


Calcium   8.8  (8.8-10.8)  mg/dL


 


Magnesium   1.5  (1.5-2.3)  mEq/L











KATHY Results - Last 24 hrs: 


 Microbiology











 01/01/18 19:02 Aerobic Blood Culture - Preliminary





 Blood    NO GROWTH AFTER 3 DAYS





 Anaerobic Blood Culture - Preliminary





    NO GROWTH AFTER 3 DAYS











Med Orders - Current: 


 Current Medications





Albuterol/Ipratropium (Duoneb 3.0-0.5 Mg/3 Ml)  3 ml NEB Q4HRRT PRN


   PRN Reason: Shortness Of Breath/wheezing


Artificial Tears (Refresh Plus 0.5%)  0 each EYEBOTH 0900,1200,1600,2100 [facility]


   Last Admin: 01/05/18 08:17 Dose:  1 drop


Carboxymethylcellu Sod/Hypromellose (Genteal Moderate To Severe Ophth Gel)  0 

ml EYELF BEDTIME [facility]


   Last Admin: 01/04/18 20:56 Dose:  1 applic


Fluticasone Propionate (Flonase)  0 gm NASBOTH DAILY [facility]


   Last Admin: 01/05/18 08:04 Dose:  1 spray


Heparin Sodium (Porcine) (Heparin Sodium)  5,000 units SUBCUT Q12HR [facility]


   Last Admin: 01/05/18 08:05 Dose:  5,000 units


Levofloxacin/Dextrose 250 mg/ (Premix)  50 mls @ 100 mls/hr IV Q48H [facility]


   Last Admin: 01/04/18 16:22 Dose:  100 mls/hr


Levothyroxine Sodium (Levothyroxine)  37.5 mcg PO DAILY@0700 [facility]


   Last Admin: 01/05/18 06:21 Dose:  37.5 mcg


Magnesium Oxide (Magnesium Oxide)  400 mg PO BID [facility]


   Last Admin: 01/05/18 08:05 Dose:  400 mg


Naproxen (Naproxen Sodium)  110 mg PO BEDTIME PRN


   PRN Reason: Pain


   Last Admin: 01/04/18 21:23 Dose:  110 mg


Ondansetron HCl (Zofran)  4 mg IVPUSH Q4H PRN


   PRN Reason: Nausea


Dulera Oral Inh 100/ (5  **Own Med**)  0 each INH DAILY@1200 [facility]


   Last Admin: 01/04/18 11:41 Dose:  1 each


Potassium Chloride (Potassium Chloride)  40 meq PO DAILY [facility]


   Last Admin: 01/05/18 08:04 Dose:  40 meq


Sodium Chloride (Saline Flush)  10 ml FLUSH ASDIRECTED PRN


   PRN Reason: Keep Vein Open


   Last Admin: 12/31/17 13:49 Dose:  10 ml


Sodium Chloride (Saline Flush)  2.5 ml FLUSH ASDIRECTED PRN


   PRN Reason: Keep Vein Open


   Last Admin: 12/31/17 13:49 Dose:  2.5 ml





Discontinued Medications





Acetaminophen (Tylenol Extra Strength)  1,000 mg PO ONETIME ONE


   Stop: 12/31/17 14:15


   Last Admin: 12/31/17 14:34 Dose:  Not Given


Albuterol/Ipratropium (Duoneb 3.0-0.5 Mg/3 Ml)  3 ml NEB ONETIME ONE


   Stop: 12/31/17 13:27


   Last Admin: 12/31/17 13:33 Dose:  3 ml


Artificial Tears (Refresh Plus 0.5%)  0 each EYEBOTH TID [facility]


   Last Admin: 01/02/18 13:01 Dose:  1 drop


Sodium Chloride (Normal Saline)  1,000 mls @ 125 mls/hr IV STAT [facility]


   Last Infusion: 12/31/17 14:35 Dose:  999 mls/hr


Sodium Chloride (Normal Saline)  1,000 mls @ 999 mls/hr IV .Bolus ONE


   Stop: 12/31/17 17:51


   Last Admin: 12/31/17 17:13 Dose:  999 mls/hr


Levofloxacin/Dextrose 750 mg/ (Premix)  150 mls @ 100 mls/hr IV ONETIME ONE


   Stop: 12/31/17 18:26


   Last Admin: 12/31/17 17:13 Dose:  100 mls/hr


Sodium Chloride (Normal Saline)  1,000 mls @ 75 mls/hr IV ASDIRECTED [facility]


   Last Admin: 01/02/18 05:48 Dose:  75 mls/hr


Sodium Chloride (Normal Saline)  500 mls @ 500 mls/hr IV .BOLUS [facility]


   Last Admin: 12/31/17 22:18 Dose:  500 mls/hr


Meropenem 1 gm/ Sodium (Chloride)  100 mls @ 200 mls/hr IV Q8H [facility]


   Last Admin: 01/01/18 23:44 Dose:  Not Given


Meropenem 500 mg/ Sodium (Chloride)  50 mls @ 100 mls/hr IV Q12H [facility]


   Last Admin: 01/02/18 01:19 Dose:  100 mls/hr


Vancomycin HCl 500 mg/ Sodium (Chloride)  100 mls @ 100 mls/hr IV Q24H [facility]


   Last Admin: 01/01/18 18:27 Dose:  100 mls/hr


Magnesium Sulfate 4 gm/ Premix  100 mls @ 50 mls/hr IV ONETIME ONE


   Stop: 01/02/18 14:20


   Last Admin: 01/02/18 12:53 Dose:  50 mls/hr


Ibuprofen (Motrin)  200 mg PO ONETIME ONE


   Stop: 12/31/17 14:22


   Last Admin: 12/31/17 14:34 Dose:  Not Given


Ibuprofen (Motrin)  400 mg PO ONETIME ONE


   Stop: 12/31/17 14:35


   Last Admin: 12/31/17 15:03 Dose:  400 mg


Ibuprofen (Motrin) Confirm Administered Dose 400 mg .ROUTE .STK-MED ONE


   Stop: 12/31/17 14:34


   Last Admin: 12/31/17 15:02 Dose:  Not Given


Potassium Chloride (Klor-Con M20)  40 meq PO ONETIME ONE


   Stop: 01/02/18 09:10


   Last Admin: 01/02/18 10:23 Dose:  Not Given


Potassium Chloride (Potassium Chloride)  40 meq PO ONETIME ONE


   Stop: 01/02/18 09:42


   Last Admin: 01/02/18 10:10 Dose:  40 meq


Vancomycin HCl (Pharmacy To Dose - Vancomycin)  1 dose .XX ASDIRECTED [facility]











- Exam


General: Reports: Alert, Oriented, Cooperative, No Acute Distress


Neck: Reports: Supple


Lungs: Reports: Clear to Auscultation, Normal Respiratory Effort


Cardiovascular: Reports: Regular Rate, Regular Rhythm


GI/Abdominal Exam: Normal Bowel Sounds, Soft, Non-Tender, No Organomegaly, No 

Distention, No Abnormal Bruit, No Mass, Pelvis Stable


Back Exam: Reports: Normal Inspection, Full Range of Motion


Extremities: Normal Inspection, Normal Range of Motion, Non-Tender, No Pedal 

Edema, Normal Capillary Refill


Skin: Reports: Warm, Dry


Neurological: Reports: No New Focal Deficit


Psy/Mental Status: Reports: Alert, Normal Affect, Normal Mood





*Q Meaningful Use (DIS)





- VTE *Q


VTE Criteria *Q: 








- Stroke *Q


Stroke Criteria *Q: 








- AMI *Q


AMI Criteria *Q:

## 2018-01-09 NOTE — CR
EXAMINATION: Oropharyngeal video swallow study.

 

HISTORY:  Dysphasia

 

COMPARISON: None

 

TECHNIQUE: Lateral images obtained, speech pathologist present, various barium consistencies provided
.

 

FINDINGS: There is adequate bolus formation and transfer. Epiglottic inversion and tracheal elevation
 is normal. There is penetration noted with thin liquids. This is possibly secondary to mild residue.
 Thicker consistencies are better tolerated. Residue along the inferior epiglottis appears to progres
s on subsequent swallows.

 

IMPRESSION: 

1. Penetration noted on several consistencies. Please see speech pathology report for full details.

## 2019-03-24 ENCOUNTER — HOSPITAL ENCOUNTER (OUTPATIENT)
Dept: HOSPITAL 56 - MW.ED | Age: 84
Setting detail: OBSERVATION
LOS: 2 days | Discharge: NURSING, REHAB OR LONG-TERM CARE | End: 2019-03-26
Attending: INTERNAL MEDICINE | Admitting: INTERNAL MEDICINE
Payer: MEDICARE

## 2019-03-24 DIAGNOSIS — R53.1: ICD-10-CM

## 2019-03-24 DIAGNOSIS — N28.9: ICD-10-CM

## 2019-03-24 DIAGNOSIS — R13.10: Primary | ICD-10-CM

## 2019-03-24 DIAGNOSIS — Z85.72: ICD-10-CM

## 2019-03-24 DIAGNOSIS — E05.90: ICD-10-CM

## 2019-03-24 DIAGNOSIS — Z51.5: ICD-10-CM

## 2019-03-24 DIAGNOSIS — E87.6: ICD-10-CM

## 2019-03-24 DIAGNOSIS — E78.00: ICD-10-CM

## 2019-03-24 DIAGNOSIS — Z91.048: ICD-10-CM

## 2019-03-24 DIAGNOSIS — Z92.3: ICD-10-CM

## 2019-03-24 DIAGNOSIS — Z88.2: ICD-10-CM

## 2019-03-24 DIAGNOSIS — E86.0: ICD-10-CM

## 2019-03-24 DIAGNOSIS — Z88.1: ICD-10-CM

## 2019-03-24 DIAGNOSIS — Z91.041: ICD-10-CM

## 2019-03-24 DIAGNOSIS — E43: ICD-10-CM

## 2019-03-24 DIAGNOSIS — Z88.0: ICD-10-CM

## 2019-03-24 LAB
CHLORIDE SERPL-SCNC: 106 MMOL/L (ref 98–107)
SODIUM SERPL-SCNC: 143 MMOL/L (ref 136–145)

## 2019-03-24 PROCEDURE — 83735 ASSAY OF MAGNESIUM: CPT

## 2019-03-24 PROCEDURE — 96374 THER/PROPH/DIAG INJ IV PUSH: CPT

## 2019-03-24 PROCEDURE — 36415 COLL VENOUS BLD VENIPUNCTURE: CPT

## 2019-03-24 PROCEDURE — 84484 ASSAY OF TROPONIN QUANT: CPT

## 2019-03-24 PROCEDURE — 84134 ASSAY OF PREALBUMIN: CPT

## 2019-03-24 PROCEDURE — 93005 ELECTROCARDIOGRAM TRACING: CPT

## 2019-03-24 PROCEDURE — 82962 GLUCOSE BLOOD TEST: CPT

## 2019-03-24 PROCEDURE — 71045 X-RAY EXAM CHEST 1 VIEW: CPT

## 2019-03-24 PROCEDURE — 81001 URINALYSIS AUTO W/SCOPE: CPT

## 2019-03-24 PROCEDURE — 85025 COMPLETE CBC W/AUTO DIFF WBC: CPT

## 2019-03-24 PROCEDURE — 99285 EMERGENCY DEPT VISIT HI MDM: CPT

## 2019-03-24 PROCEDURE — 82550 ASSAY OF CK (CPK): CPT

## 2019-03-24 PROCEDURE — 80053 COMPREHEN METABOLIC PANEL: CPT

## 2019-03-24 PROCEDURE — G0378 HOSPITAL OBSERVATION PER HR: HCPCS

## 2019-03-24 RX ADMIN — ONDANSETRON SCH MG: 2 INJECTION, SOLUTION INTRAMUSCULAR; INTRAVENOUS at 23:07

## 2019-03-24 NOTE — PCM.HP
H&P History of Present Illness





- General


Date of Service: 03/24/19


Admit Problem/Dx: 


 Admission Diagnosis/Problem





Admission Diagnosis/Problem      Dehydration








Source of Information: Patient, Family


History Limitations: Reports: No Limitations





- History of Present Illness


Initial Comments - Free Text/Narative: 





The patient is an 89-year-old lady who had presented to the emergency 

department with a complaint of weakness, fatigue and inability to swallow. The 

patient says that this is been going on for several months. She has a history 

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 

esophagitis. The patient had been given fluids in the emergency department. The 

patient has marked difficulty in swallowing and will cough after consuming 

small amounts of food or water. The patient has been on a mechanically soft 

diet usually just soft-boiled egg or yogurt. The patient has an appointment for 

[facility] intake Tuesday this week. The patient has had pain in her 

throat but no other pain. She's had no nausea or vomiting.


Onset of Symptoms: Reports: Gradual


Duration of Symptoms: Reports: Week(s):, Getting Worse


Severity: Moderate


Improves with: Reports: None


Worsens with: Reports: Eating


Associated Symptoms: Reports: No Other Symptoms





- Related Data


Allergies/Adverse Reactions: 


 Allergies











Allergy/AdvReac Type Severity Reaction Status Date / Time


 


cephalexin monohydrate Allergy  Hives Verified 12/31/17 14:41





[From Keflex]     


 


ciprofloxacin [From Cipro] Allergy  Other Verified 12/31/17 14:42


 


erythromycin base Allergy  Hives Verified 12/31/17 14:41


 


gluten Allergy  Stomach Verified 12/31/17 14:41





   Ache  


 


Penicillins Allergy  Hives Verified 12/31/17 14:41


 


Sulfa (Sulfonamide Allergy  Hives Verified 12/31/17 14:41





Antibiotics)     


 


contrast Dye Allergy  Hives Uncoded 12/31/17 14:41


 


tape adhesives (USE PAPER Allergy  Swelling Uncoded 12/31/17 14:41





TAPE ONLY     











Home Medications: 


 Home Meds





. [No Known Home Meds]  03/24/19 [History]











Past Medical History


HEENT History: Reports: Hard of Hearing, Impaired Vision, Other (See Below)


Other HEENT History: blurry vision and pain to left eye


Cardiovascular History: Reports: Arrhythmia, High Cholesterol


Respiratory History: Reports: Other (See Below)


Other Respiratory History: Oxygen use at NOC at home


Gastrointestinal History: Reports: Celiac Disease


Genitourinary History: Reports: Urinary Incontinence, UTI, Recurrent


Musculoskeletal History: Reports: Osteoporosis, RA


Neurological History: Reports: None


Psychiatric History: Reports: Anxiety, Depression


Endocrine/Metabolic History: Reports: Hyperthyroidism, Osteoporosis


Hematologic History: Reports: Anemia, Blood Transfusion(s)


Oncologic (Cancer) History: Reports: Lymphoma, Other (See Below)


Other Oncologic History: Waldenstrom's Lymphoma affecting lungs, abdomen, and 

sinuses


Dermatologic History: Reports: None





- Infectious Disease History


Infectious Disease History: Reports: Hepatitis non A,B,C, Measles, MRSA, Mumps, 

Rubella, Other (See Below)


Other Infectious Disease History: patient cleared for MRSA





- Past Surgical History


HEENT Surgical History: Reports: Cataract Surgery


Cardiovascular Surgical History: Reports: None


Respiratory Surgical History: Reports: None


GI Surgical History: Reports: Other (See Below)


Other GI Surgeries/Procedures: hx. feeding tube placement, no longer in place


Female  Surgical History: Reports: Hysterectomy


Neurological Surgical History: Reports: None


Musculoskeletal Surgical History: Reports: None





Social & Family History





- Family History


Family Medical History: Noncontributory





- Tobacco Use


Smoking Status *Q: Never Smoker





- Caffeine Use


Caffeine Use: Reports: Coffee


Other Caffeine Use: 1 cup per day





- Recreational Drug Use


Recreational Drug Use: No





H&P Review of Systems





- Review of Systems:


Review Of Systems: See Below


General: Reports: Weakness, Fatigue, Decreased Appetite


HEENT: Reports: No Symptoms


Pulmonary: Reports: No Symptoms


Cardiovascular: Reports: No Symptoms


Gastrointestinal: Reports: Anorexia, Decreased Appetite


Genitourinary: Reports: No Symptoms


Musculoskeletal: Reports: No Symptoms


Skin: Reports: No Symptoms


Psychiatric: Reports: No Symptoms


Neurological: Reports: No Symptoms


Hematologic/Lymphatic: Reports: No Symptoms


Immunologic: Reports: No Symptoms





Exam





- Exam


Exam: See Below





- Vital Signs


Vital Signs: 


 Last Vital Signs











Temp  35.2 C L  03/24/19 12:02


 


Pulse  63   03/24/19 15:26


 


Resp  20   03/24/19 12:02


 


BP  107/69   03/24/19 15:26


 


Pulse Ox  95   03/24/19 15:26











Weight: 31.751 kg





- Exam


Quality Assessment: No: Supplemental Oxygen


General: Alert, Oriented, Cooperative, Mild Distress, Other (Cachexia)


HEENT: Conjunctiva Clear, EACs Clear, PERRLA.  No: Mucosa Moist & Pink (Dry)


Neck: Supple (Thin), Trachea Midline


Lungs: Rales (Bibasilar)


Cardiovascular: Regular Rate, Regular Rhythm


GI/Abdominal Exam: Soft, Non-Tender, No Distention.  No: Normal Bowel Sounds (

Hypoactive), Guarding, Rigid


 (Female) Exam: Deferred


Rectal (Female) Exam: Deferred


Back Exam: No: Normal Inspection (Mild kyphosis)


Extremities: Normal Inspection, No Pedal Edema


Skin: Warm, Dry, Intact


Neurological: Cranial Nerves Intact


Neuro Extensive - Mental Status: Alert, Oriented x3


Psychiatric: Alert, Normal Affect, Normal Mood





- Patient Data


Lab Results Last 24 hrs: 


 Laboratory Results - last 24 hr











  03/24/19 03/24/19 03/24/19 Range/Units





  12:27 12:27 12:27 


 


WBC  5.06    (4.0-11.0)  K/uL


 


RBC  4.94    (4.30-5.90)  M/uL


 


Hgb  13.6    (12.0-16.0)  g/dL


 


Hct  41.2    (36.0-46.0)  %


 


MCV  83.4    (80.0-98.0)  fL


 


MCH  27.5    (27.0-32.0)  pg


 


MCHC  33.0    (31.0-37.0)  g/dL


 


RDW Std Deviation  46.5    (28.0-62.0)  fl


 


RDW Coeff of Brennon  15    (11.0-15.0)  %


 


Plt Count  166    (150-400)  K/uL


 


MPV  9.10    (7.40-12.00)  fL


 


Neut % (Auto)  48.6    (48.0-80.0)  %


 


Lymph % (Auto)  40.7 H    (16.0-40.0)  %


 


Mono % (Auto)  8.7    (0.0-15.0)  %


 


Eos % (Auto)  1.8    (0.0-7.0)  %


 


Baso % (Auto)  0.2    (0.0-1.5)  %


 


Neut # (Auto)  2.5    (1.4-5.7)  K/uL


 


Lymph # (Auto)  2.1    (0.6-2.4)  K/uL


 


Mono # (Auto)  0.4    (0.0-0.8)  K/uL


 


Eos # (Auto)  0.1    (0.0-0.7)  K/uL


 


Baso # (Auto)  0.0    (0.0-0.1)  K/uL


 


Nucleated RBC %  0.0    /100WBC


 


Nucleated RBCs #  0    K/uL


 


Sodium   143   (136-145)  mmol/L


 


Potassium   3.2 L   (3.5-5.1)  mmol/L


 


Chloride   106   ()  mmol/L


 


Carbon Dioxide   23.3   (21.0-32.0)  mmol/L


 


BUN   53 H   (7.0-18.0)  mg/dL


 


Creatinine   1.8 H   (0.6-1.0)  mg/dL


 


Est Cr Clr Drug Dosing   10.62   mL/min


 


Estimated GFR (MDRD)   26.5   ml/min


 


Glucose   90   ()  mg/dL


 


Calcium   11.8 H   (8.5-10.1)  mg/dL


 


Total Bilirubin   0.9   (0.2-1.0)  mg/dL


 


AST   31   (15-37)  IU/L


 


ALT   32   (14-63)  IU/L


 


Alkaline Phosphatase   63   ()  U/L


 


Creatine Kinase    17 L  ()  U/L


 


Troponin I   < 0.050   (0.000-0.056)  ng/mL


 


Total Protein   8.0   (6.4-8.2)  g/dL


 


Albumin   2.4 L   (3.4-5.0)  g/dL


 


Globulin   5.6 H   (2.6-4.0)  g/dL


 


Albumin/Globulin Ratio   0.4 L   (0.9-1.6)  


 


Urine Color     


 


Urine Appearance     


 


Urine pH     (5.0-8.0)  


 


Ur Specific Gravity     (1.001-1.035)  


 


Urine Protein     (NEGATIVE)  mg/dL


 


Urine Glucose (UA)     (NEGATIVE)  mg/dL


 


Urine Ketones     (NEGATIVE)  mg/dL


 


Urine Occult Blood     (NEGATIVE)  


 


Urine Nitrite     (NEGATIVE)  


 


Urine Bilirubin     (NEGATIVE)  


 


Urine Urobilinogen     (<2.0)  EU/dL


 


Ur Leukocyte Esterase     (NEGATIVE)  


 


Urine RBC     (0-2/HPF)  


 


Urine WBC     (0-5/HPF)  


 


Ur Epithelial Cells     (NONE-FEW)  


 


Urine Bacteria     (NEGATIVE)  


 


Urine Mucus     (NONE-MOD)  














  03/24/19 Range/Units





  13:52 


 


WBC   (4.0-11.0)  K/uL


 


RBC   (4.30-5.90)  M/uL


 


Hgb   (12.0-16.0)  g/dL


 


Hct   (36.0-46.0)  %


 


MCV   (80.0-98.0)  fL


 


MCH   (27.0-32.0)  pg


 


MCHC   (31.0-37.0)  g/dL


 


RDW Std Deviation   (28.0-62.0)  fl


 


RDW Coeff of Brennon   (11.0-15.0)  %


 


Plt Count   (150-400)  K/uL


 


MPV   (7.40-12.00)  fL


 


Neut % (Auto)   (48.0-80.0)  %


 


Lymph % (Auto)   (16.0-40.0)  %


 


Mono % (Auto)   (0.0-15.0)  %


 


Eos % (Auto)   (0.0-7.0)  %


 


Baso % (Auto)   (0.0-1.5)  %


 


Neut # (Auto)   (1.4-5.7)  K/uL


 


Lymph # (Auto)   (0.6-2.4)  K/uL


 


Mono # (Auto)   (0.0-0.8)  K/uL


 


Eos # (Auto)   (0.0-0.7)  K/uL


 


Baso # (Auto)   (0.0-0.1)  K/uL


 


Nucleated RBC %   /100WBC


 


Nucleated RBCs #   K/uL


 


Sodium   (136-145)  mmol/L


 


Potassium   (3.5-5.1)  mmol/L


 


Chloride   ()  mmol/L


 


Carbon Dioxide   (21.0-32.0)  mmol/L


 


BUN   (7.0-18.0)  mg/dL


 


Creatinine   (0.6-1.0)  mg/dL


 


Est Cr Clr Drug Dosing   mL/min


 


Estimated GFR (MDRD)   ml/min


 


Glucose   ()  mg/dL


 


Calcium   (8.5-10.1)  mg/dL


 


Total Bilirubin   (0.2-1.0)  mg/dL


 


AST   (15-37)  IU/L


 


ALT   (14-63)  IU/L


 


Alkaline Phosphatase   ()  U/L


 


Creatine Kinase   ()  U/L


 


Troponin I   (0.000-0.056)  ng/mL


 


Total Protein   (6.4-8.2)  g/dL


 


Albumin   (3.4-5.0)  g/dL


 


Globulin   (2.6-4.0)  g/dL


 


Albumin/Globulin Ratio   (0.9-1.6)  


 


Urine Color  YELLOW  


 


Urine Appearance  SLT CLOUDY  


 


Urine pH  5.5  (5.0-8.0)  


 


Ur Specific Gravity  >= 1.030  (1.001-1.035)  


 


Urine Protein  30 H  (NEGATIVE)  mg/dL


 


Urine Glucose (UA)  NEGATIVE  (NEGATIVE)  mg/dL


 


Urine Ketones  NEGATIVE  (NEGATIVE)  mg/dL


 


Urine Occult Blood  NEGATIVE  (NEGATIVE)  


 


Urine Nitrite  NEGATIVE  (NEGATIVE)  


 


Urine Bilirubin  NEGATIVE  (NEGATIVE)  


 


Urine Urobilinogen  0.2  (<2.0)  EU/dL


 


Ur Leukocyte Esterase  NEGATIVE  (NEGATIVE)  


 


Urine RBC  0-1  (0-2/HPF)  


 


Urine WBC  0-3  (0-5/HPF)  


 


Ur Epithelial Cells  OCCASIONAL  (NONE-FEW)  


 


Urine Bacteria  FEW  (NEGATIVE)  


 


Urine Mucus  LIGHT  (NONE-MOD)  











Result Diagrams: 


 03/24/19 12:27





 03/24/19 12:27





*Q Meaningful Use (ADM)





- VTE *Q


VTE Mechanical Contraindications *Q: At Risk for Falls





- Problem List


(1) Dehydration


SNOMED Code(s): 40426715


   ICD Code: E86.0 - DEHYDRATION   Status: Acute   Priority: High   Current 

Visit: Yes   





(2) Severe protein-calorie malnutrition


SNOMED Code(s): 411466585, 430923977, 181499443


   ICD Code: E43 - UNSPECIFIED SEVERE PROTEIN-CALORIE MALNUTRITION   Status: 

Chronic   Priority: High   Current Visit: Yes   





(3) Renal insufficiency


SNOMED Code(s): 601065199, 343003867


   ICD Code: N28.9 - DISORDER OF KIDNEY AND URETER, UNSPECIFIED   Status: Acute

   Priority: High   Current Visit: Yes   





(4) General weakness


SNOMED Code(s): 57868329


   ICD Code: R53.1 - WEAKNESS   Status: Chronic   Priority: High   Current Visit

: Yes   





(5) Hx of lymphoma


SNOMED Code(s): 024541123


   ICD Code: Z85.72 - PERSONAL HISTORY OF NON-HODGKIN LYMPHOMAS   Status: 

Chronic   Priority: Medium   Current Visit: Yes   





(6) Dysphagia


SNOMED Code(s): 95368547, 499212139


   ICD Code: R13.10 - DYSPHAGIA, UNSPECIFIED   Status: Acute   Priority: Medium

   Current Visit: Yes   


Qualifiers: 


   Dysphagia type: esophageal phase   Qualified Code(s): R13.10 - Dysphagia, 

unspecified   


Problem List Initiated/Reviewed/Updated: Yes


Orders Last 24hrs: 


 Active Orders 24 hr











 Category Date Time Status


 


 Admission Status [Patient Status] [ADT] Stat ADT  03/24/19 15:27 Active


 


 Antiembolic Devices [RC] PER UNIT ROUTINE Care  03/24/19 16:27 Ordered


 


 Bedrest Bedside Commode [RC] ASDIRECTED Care  03/24/19 16:25 Ordered


 


 EKG Documentation Completion [RC] STAT Care  03/24/19 12:06 Active


 


 VTE/DVT Education [RC] PER UNIT ROUTINE Care  03/24/19 16:25 Ordered


 


 Vital Signs [RC] Q8H Care  03/24/19 16:25 Ordered


 


 Consult to Hospice [CONS] Routine Cons  03/24/19 16:25 Ordered


 


 OT Evaluation and Treatment [CONS] Routine Cons  03/24/19 16:25 Ordered


 


 PT Evaluation and Treatment [CONS] Routine Cons  03/24/19 16:25 Ordered


 


 Mechanical Soft Diet [DIET] Diet  03/24/19 Breakfast Ordered


 


 MAGNESIUM [CHEM] AM Lab  03/25/19 05:11 Ordered


 


 Morphine Med  03/24/19 16:25 Ordered





 1 mg IVPUSH Q2H PRN   


 


 Ondansetron [Zofran] Med  03/24/19 16:30 Ordered





 4 mg IVPUSH Q6H   


 


 Sodium Chloride 0.9% [Normal Saline] 1,000 ml Med  03/24/19 16:30 Ordered





 IV ASDIRECTED   


 


 Sodium Chloride 0.9% [Normal Saline] 1,000 ml Med  03/24/19 13:30 Active





 IV STAT   


 


 Sodium Chloride 0.9% [Normal Saline] 500 ml Med  03/24/19 12:15 Active





 IV STAT   


 


 Antiembolic Hose [OM.PC] Per Unit Routine Oth  03/24/19 16:26 Ordered


 


 VTE Mechanical Contraindications [AST] Per Unit Routine Oth  03/24/19 16:25 

Ordered


 


 Resuscitation Status Routine Resus Stat  03/24/19 16:25 Ordered








 Medication Orders





Sodium Chloride (Normal Saline)  500 mls @ 999 mls/hr IV STAT ABBEY


   Last Admin: 03/24/19 12:29  Dose: 999 mls/hr


Sodium Chloride (Normal Saline)  1,000 mls @ 125 mls/hr IV STAT ABBEY


   Last Admin: 03/24/19 14:25  Dose: 125 mls/hr


Sodium Chloride (Normal Saline)  1,000 mls @ 100 mls/hr IV ASDIRECTED ABBEY


Morphine Sulfate (Morphine)  1 mg IVPUSH Q2H PRN


   PRN Reason: Pain (severe 7-10)


   Stop: 03/25/19 16:27


Ondansetron HCl (Zofran)  4 mg IVPUSH Q6H [facility]








Assessment/Plan Comment:: 





The patient is an 89-year-old lady who is chronically ill with a number of 

medical issues mostly related to her previous radiation and chemotherapy for 

her Hodgkin's lymphoma. The patient has been unable secondary to her dysphagia 

to maintain adequate calorie intake or hydration. The patient is severely 

dehydrated and shows signs of severe protein calorie malnutrition. Most the 

time the patient has been able to consume only soft foods. The patient will be 

fluid resuscitated gently with the use of IV normal saline. I've also ordered 

that the patient have vital signs monitored and this will help with her fluid 

status. The patient is currently pending an evaluation intake is [facility] on Tuesday and this should be kept. I've also ordered repeat laboratory 

studies for the morning. The patient will also be kept on DVT prophylaxis with 

the use of antiembolic stockings. The patient has been encouraged to ambulate 

to the best of her ability.

## 2019-03-24 NOTE — EDM.PDOC
ED HPI GENERAL MEDICAL PROBLEM





- General


Chief Complaint: General


Stated Complaint: UNK


Time Seen by Provider: 03/24/19 12:07


Source of Information: Reports: Patient





- History of Present Illness


INITIAL COMMENTS - FREE TEXT/NARRATIVE: 





HISTORY AND PHYSICAL:


History of present illness:


[Patient presents with weakness and nausea or private vehicle





Chest remote cancer history with radiation therapy





She has been having difficulty with water and oral intake in general, she is 

scheduled for admission physical for [facility] on Tuesday





No fever nausea vomiting chills sweats











]


Review of systems: 


As per history of present illness and below otherwise all systems reviewed and 

negative.


Past medical history: 


As per history of present illness and as reviewed below otherwise 

noncontributory.


Surgical history: 


As per history of present illness and as reviewed below otherwise 

noncontributory.


Social history: 


No reported history of drug or alcohol abuse.


Family history: 


As per history of present illness and as reviewed below otherwise 

noncontributory.


Physical exam:


HEENT: Atraumatic, normocephalic, pupils reactive, negative for conjunctival 

pallor or scleral icterus, mucous membranes moist, throat clear, neck supple, 

nontender, trachea midline.


Lungs: Clear to auscultation, breath sounds equal bilaterally, chest nontender.


Heart: S1S2, regular, negative for clicks, rubs, or JVD.


Abdomen: Soft, nondistended, nontender. Negative for masses or 

hepatosplenomegaly. Negative for costovertebral tenderness.


Pelvis: Stable nontender.


Genitourinary: Deferred.


Rectal: Deferred.


Extremities: Atraumatic, negative for cords or calf pain. Neurovascular 

unremarkable.


Neuro: Awake, alert, oriented. Cranial nerves II through XII unremarkable. 

Cerebellum unremarkable. Motor and sensory unremarkable throughout. Exam 

nonfocal.


Skin is dry with tenting





Diagnostics:


[CBC CMP UA troponin 


EKG


Chest 1 view


]


Therapeutics:


[ normal saline


Zofran


]


Impression: 


Dehydration


[ weakness/nausea ]


Renal insufficiency


Chronic history of baseline


Definitive disposition and diagnosis as appropriate pending reevaluation and 

review of above.





- Related Data


 Allergies











Allergy/AdvReac Type Severity Reaction Status Date / Time


 


cephalexin monohydrate Allergy  Hives Verified 12/31/17 14:41





[From Keflex]     


 


ciprofloxacin [From Cipro] Allergy  Other Verified 12/31/17 14:42


 


erythromycin base Allergy  Hives Verified 12/31/17 14:41


 


gluten Allergy  Stomach Verified 12/31/17 14:41





   Ache  


 


Penicillins Allergy  Hives Verified 12/31/17 14:41


 


Sulfa (Sulfonamide Allergy  Hives Verified 12/31/17 14:41





Antibiotics)     


 


contrast Dye Allergy  Hives Uncoded 12/31/17 14:41


 


tape adhesives (USE PAPER Allergy  Swelling Uncoded 12/31/17 14:41





TAPE ONLY     











Home Meds: 


 Home Meds





. [No Known Home Meds]  03/24/19 [History]











Past Medical History


HEENT History: Reports: Hard of Hearing, Impaired Vision, Other (See Below)


Other HEENT History: blurry vision and pain to left eye


Cardiovascular History: Reports: Arrhythmia, High Cholesterol


Respiratory History: Reports: Other (See Below)


Other Respiratory History: Oxygen use at NOC at home


Gastrointestinal History: Reports: Celiac Disease


Genitourinary History: Reports: Urinary Incontinence, UTI, Recurrent


Musculoskeletal History: Reports: Osteoporosis, RA


Neurological History: Reports: None


Psychiatric History: Reports: Anxiety, Depression


Endocrine/Metabolic History: Reports: Hyperthyroidism, Osteoporosis


Hematologic History: Reports: Anemia, Blood Transfusion(s)


Oncologic (Cancer) History: Reports: Lymphoma, Other (See Below)


Other Oncologic History: Waldenstrom's Lymphoma affecting lungs, abdomen, and 

sinuses


Dermatologic History: Reports: None





- Infectious Disease History


Infectious Disease History: Reports: Hepatitis non A,B,C, Measles, MRSA, Mumps, 

Rubella, Other (See Below)


Other Infectious Disease History: patient cleared for MRSA





- Past Surgical History


HEENT Surgical History: Reports: Cataract Surgery


Cardiovascular Surgical History: Reports: None


Respiratory Surgical History: Reports: None


GI Surgical History: Reports: Other (See Below)


Other GI Surgeries/Procedures: hx. feeding tube placement, no longer in place


Female  Surgical History: Reports: Hysterectomy


Neurological Surgical History: Reports: None


Musculoskeletal Surgical History: Reports: None





Social & Family History





- Family History


Family Medical History: Noncontributory





- Caffeine Use


Caffeine Use: Reports: Coffee


Other Caffeine Use: 1 cup per day





ED ROS GENERAL





- Review of Systems


Review Of Systems: See Below





ED EXAM, GENERAL





- Physical Exam


Exam: See Below





Course





- Vital Signs


Last Recorded V/S: 


 Last Vital Signs











Temp  95.3 F L  03/24/19 12:02


 


Pulse  63   03/24/19 15:26


 


Resp  20   03/24/19 12:02


 


BP  107/69   03/24/19 15:26


 


Pulse Ox  95   03/24/19 15:26














- Orders/Labs/Meds


Orders: 


 Active Orders 24 hr











 Category Date Time Status


 


 EKG Documentation Completion [RC] STAT Care  03/24/19 12:06 Active


 


 Sodium Chloride 0.9% [Normal Saline] 1,000 ml Med  03/24/19 13:30 Active





 IV STAT   


 


 Sodium Chloride 0.9% [Normal Saline] 500 ml Med  03/24/19 12:15 Active





 IV STAT   








 Medication Orders





Sodium Chloride (Normal Saline)  500 mls @ 999 mls/hr IV STAT ABBEY


   Last Admin: 03/24/19 12:29  Dose: 999 mls/hr


Sodium Chloride (Normal Saline)  1,000 mls @ 125 mls/hr IV STAT ABBEY


   Last Admin: 03/24/19 14:25  Dose: 125 mls/hr








Labs: 


 Laboratory Tests











  03/24/19 03/24/19 03/24/19 Range/Units





  12:27 12:27 12:27 


 


WBC  5.06    (4.0-11.0)  K/uL


 


RBC  4.94    (4.30-5.90)  M/uL


 


Hgb  13.6    (12.0-16.0)  g/dL


 


Hct  41.2    (36.0-46.0)  %


 


MCV  83.4    (80.0-98.0)  fL


 


MCH  27.5    (27.0-32.0)  pg


 


MCHC  33.0    (31.0-37.0)  g/dL


 


RDW Std Deviation  46.5    (28.0-62.0)  fl


 


RDW Coeff of Brennon  15    (11.0-15.0)  %


 


Plt Count  166    (150-400)  K/uL


 


MPV  9.10    (7.40-12.00)  fL


 


Neut % (Auto)  48.6    (48.0-80.0)  %


 


Lymph % (Auto)  40.7 H    (16.0-40.0)  %


 


Mono % (Auto)  8.7    (0.0-15.0)  %


 


Eos % (Auto)  1.8    (0.0-7.0)  %


 


Baso % (Auto)  0.2    (0.0-1.5)  %


 


Neut # (Auto)  2.5    (1.4-5.7)  K/uL


 


Lymph # (Auto)  2.1    (0.6-2.4)  K/uL


 


Mono # (Auto)  0.4    (0.0-0.8)  K/uL


 


Eos # (Auto)  0.1    (0.0-0.7)  K/uL


 


Baso # (Auto)  0.0    (0.0-0.1)  K/uL


 


Nucleated RBC %  0.0    /100WBC


 


Nucleated RBCs #  0    K/uL


 


Sodium   143   (136-145)  mmol/L


 


Potassium   3.2 L   (3.5-5.1)  mmol/L


 


Chloride   106   ()  mmol/L


 


Carbon Dioxide   23.3   (21.0-32.0)  mmol/L


 


BUN   53 H   (7.0-18.0)  mg/dL


 


Creatinine   1.8 H   (0.6-1.0)  mg/dL


 


Est Cr Clr Drug Dosing   10.62   mL/min


 


Estimated GFR (MDRD)   26.5   ml/min


 


Glucose   90   ()  mg/dL


 


Calcium   11.8 H   (8.5-10.1)  mg/dL


 


Total Bilirubin   0.9   (0.2-1.0)  mg/dL


 


AST   31   (15-37)  IU/L


 


ALT   32   (14-63)  IU/L


 


Alkaline Phosphatase   63   ()  U/L


 


Creatine Kinase    17 L  ()  U/L


 


Troponin I   < 0.050   (0.000-0.056)  ng/mL


 


Total Protein   8.0   (6.4-8.2)  g/dL


 


Albumin   2.4 L   (3.4-5.0)  g/dL


 


Globulin   5.6 H   (2.6-4.0)  g/dL


 


Albumin/Globulin Ratio   0.4 L   (0.9-1.6)  


 


Urine Color     


 


Urine Appearance     


 


Urine pH     (5.0-8.0)  


 


Ur Specific Gravity     (1.001-1.035)  


 


Urine Protein     (NEGATIVE)  mg/dL


 


Urine Glucose (UA)     (NEGATIVE)  mg/dL


 


Urine Ketones     (NEGATIVE)  mg/dL


 


Urine Occult Blood     (NEGATIVE)  


 


Urine Nitrite     (NEGATIVE)  


 


Urine Bilirubin     (NEGATIVE)  


 


Urine Urobilinogen     (<2.0)  EU/dL


 


Ur Leukocyte Esterase     (NEGATIVE)  


 


Urine RBC     (0-2/HPF)  


 


Urine WBC     (0-5/HPF)  


 


Ur Epithelial Cells     (NONE-FEW)  


 


Urine Bacteria     (NEGATIVE)  


 


Urine Mucus     (NONE-MOD)  














  03/24/19 Range/Units





  13:52 


 


WBC   (4.0-11.0)  K/uL


 


RBC   (4.30-5.90)  M/uL


 


Hgb   (12.0-16.0)  g/dL


 


Hct   (36.0-46.0)  %


 


MCV   (80.0-98.0)  fL


 


MCH   (27.0-32.0)  pg


 


MCHC   (31.0-37.0)  g/dL


 


RDW Std Deviation   (28.0-62.0)  fl


 


RDW Coeff of Brennon   (11.0-15.0)  %


 


Plt Count   (150-400)  K/uL


 


MPV   (7.40-12.00)  fL


 


Neut % (Auto)   (48.0-80.0)  %


 


Lymph % (Auto)   (16.0-40.0)  %


 


Mono % (Auto)   (0.0-15.0)  %


 


Eos % (Auto)   (0.0-7.0)  %


 


Baso % (Auto)   (0.0-1.5)  %


 


Neut # (Auto)   (1.4-5.7)  K/uL


 


Lymph # (Auto)   (0.6-2.4)  K/uL


 


Mono # (Auto)   (0.0-0.8)  K/uL


 


Eos # (Auto)   (0.0-0.7)  K/uL


 


Baso # (Auto)   (0.0-0.1)  K/uL


 


Nucleated RBC %   /100WBC


 


Nucleated RBCs #   K/uL


 


Sodium   (136-145)  mmol/L


 


Potassium   (3.5-5.1)  mmol/L


 


Chloride   ()  mmol/L


 


Carbon Dioxide   (21.0-32.0)  mmol/L


 


BUN   (7.0-18.0)  mg/dL


 


Creatinine   (0.6-1.0)  mg/dL


 


Est Cr Clr Drug Dosing   mL/min


 


Estimated GFR (MDRD)   ml/min


 


Glucose   ()  mg/dL


 


Calcium   (8.5-10.1)  mg/dL


 


Total Bilirubin   (0.2-1.0)  mg/dL


 


AST   (15-37)  IU/L


 


ALT   (14-63)  IU/L


 


Alkaline Phosphatase   ()  U/L


 


Creatine Kinase   ()  U/L


 


Troponin I   (0.000-0.056)  ng/mL


 


Total Protein   (6.4-8.2)  g/dL


 


Albumin   (3.4-5.0)  g/dL


 


Globulin   (2.6-4.0)  g/dL


 


Albumin/Globulin Ratio   (0.9-1.6)  


 


Urine Color  YELLOW  


 


Urine Appearance  SLT CLOUDY  


 


Urine pH  5.5  (5.0-8.0)  


 


Ur Specific Gravity  >= 1.030  (1.001-1.035)  


 


Urine Protein  30 H  (NEGATIVE)  mg/dL


 


Urine Glucose (UA)  NEGATIVE  (NEGATIVE)  mg/dL


 


Urine Ketones  NEGATIVE  (NEGATIVE)  mg/dL


 


Urine Occult Blood  NEGATIVE  (NEGATIVE)  


 


Urine Nitrite  NEGATIVE  (NEGATIVE)  


 


Urine Bilirubin  NEGATIVE  (NEGATIVE)  


 


Urine Urobilinogen  0.2  (<2.0)  EU/dL


 


Ur Leukocyte Esterase  NEGATIVE  (NEGATIVE)  


 


Urine RBC  0-1  (0-2/HPF)  


 


Urine WBC  0-3  (0-5/HPF)  


 


Ur Epithelial Cells  OCCASIONAL  (NONE-FEW)  


 


Urine Bacteria  FEW  (NEGATIVE)  


 


Urine Mucus  LIGHT  (NONE-MOD)  











Meds: 


Medications











Generic Name Dose Route Start Last Admin





  Trade Name Freq  PRN Reason Stop Dose Admin


 


Sodium Chloride  500 mls @ 999 mls/hr  03/24/19 12:15  03/24/19 12:29





  Normal Saline  IV   999 mls/hr





  STAT ABBEY   Administration





     





     





     





     


 


Sodium Chloride  1,000 mls @ 125 mls/hr  03/24/19 13:30  03/24/19 14:25





  Normal Saline  IV   125 mls/hr





  STAT ABBEY   Administration





     





     





     





     














Discontinued Medications














Generic Name Dose Route Start Last Admin





  Trade Name Freq  PRN Reason Stop Dose Admin


 


Ondansetron HCl  4 mg  03/24/19 12:06  03/24/19 12:29





  Zofran  IVPUSH  03/24/19 12:07  4 mg





  ONETIME ONE   Administration





     





     





     





     














Departure





- Departure


Time of Disposition: 15:27


Disposition: Refer to Observation


Condition: Fair


Clinical Impression: 


 Dehydration, Renal insufficiency








- Discharge Information


Forms:  ED Department Discharge


Additional Instructions: 


The following information is given to patients seen in the emergency department 

who are being discharged to home. This information is to outline your options 

for follow-up care. We provide all patients seen in our emergency department 

with a follow-up referral.





The need for follow-up, as well as the timing and circumstances, are variable 

depending upon the specifics of your emergency department visit.





If you don't have a primary care physician on staff, we will provide you with a 

referral. We always advise you to contact your personal physician following an 

emergency department visit to inform them of the circumstance of the visit and 

for follow-up with them and/or the need for any referrals to a consulting 

specialist.





The emergency department will also refer you to a specialist when appropriate. 

This referral assures that you have the opportunity for follow-up care with a 

specialist. All of these measure are taken in an effort to provide you with 

optimal care, which includes your follow-up.





Under all circumstances we always encourage you to contact your private 

physician who remains a resource for coordinating your care. When calling for 

follow-up care, please make the office aware that this follow-up is from your 

recent emergency room visit. If for any reason you are refused follow-up, 

please contact the [facility] emergency department at [phone] 

and asked to speak to the emergency department charge nurse.








- My Orders


Last 24 Hours: 


My Active Orders





03/24/19 12:06


EKG Documentation Completion [RC] STAT 





03/24/19 12:15


Sodium Chloride 0.9% [Normal Saline] 500 ml IV STAT 





03/24/19 13:30


Sodium Chloride 0.9% [Normal Saline] 1,000 ml IV STAT 














- Assessment/Plan


Last 24 Hours: 


My Active Orders





03/24/19 12:06


EKG Documentation Completion [RC] STAT 





03/24/19 12:15


Sodium Chloride 0.9% [Normal Saline] 500 ml IV STAT 





03/24/19 13:30


Sodium Chloride 0.9% [Normal Saline] 1,000 ml IV STAT

## 2019-03-24 NOTE — CR
Pain shortness breath 



Portable chest. 



No comparison studies are available. 



Findings: 



Mild prominence of the cardiac silhouette.



No acute airspace or interstitial process seen. No effusion. Mild apical 

fibrotic change.



IMPRESSION:



1. No acute pulmonary process.



Dictated by Jill Melton MD @ Mar 24 2019  1:40PM



Signed by Dr. Jill Melton @ Mar 24 2019  1:41PM

## 2019-03-24 NOTE — PCM.SN
- Free Text/Narrative


Note: 


CALLED TO PLACE [facility]IV ON DIFFICULT ACCESS PATIENT.  ATTEMPTED X2 R WRIST, 25 LENNY 

PLACED l fa.

## 2019-03-25 LAB
CHLORIDE SERPL-SCNC: 111 MMOL/L (ref 98–107)
SODIUM SERPL-SCNC: 143 MMOL/L (ref 136–145)

## 2019-03-25 RX ADMIN — ONDANSETRON SCH MG: 2 INJECTION, SOLUTION INTRAMUSCULAR; INTRAVENOUS at 04:19

## 2019-03-25 RX ADMIN — ONDANSETRON SCH: 2 INJECTION, SOLUTION INTRAMUSCULAR; INTRAVENOUS at 10:13

## 2019-03-25 NOTE — PCM.PN
<Kristine Laboy M - Last Filed: 03/25/19 10:16>





- General Info


Date of Service: 03/25/19


Admission Dx/Problem (Free Text): 


 Admission Diagnosis/Problem





Admission Diagnosis/Problem      Dehydration








Subjective Update: 





Sitting in bed this morning, took a drink of milk, coughing and trouble with 

swallowing noted. Denies any pain. Requesting daughters be here to speak with me











Daughters arrived, spoke with Viridiana and Arlet regarding [facility] and Hospice 

care. They are willing to speak with Hospice regarding how they can assistance 

patient. Samantha requests no further IV pokes or lab pokes. She and family have 

spoke about feeding tube and they would not want this. They also do not want 

PICC line to be placed either. 


Functional Status: Reports: Pain Controlled, Ambulating, Urinating.  Denies: 

Tolerating Diet





- Review of Systems


General: Reports: Weakness, Fatigue


HEENT: Reports: No Symptoms.  Denies: Headaches, Sore Throat, Visual Changes


Pulmonary: Reports: No Symptoms.  Denies: Shortness of Breath


Cardiovascular: Reports: No Symptoms.  Denies: Chest Pain


Gastrointestinal: Reports: No Symptoms.  Denies: Abdominal Pain, Nausea, 

Vomiting


Genitourinary: Reports: No Symptoms.  Denies: Dysuria, Frequency, Burning


Musculoskeletal: Reports: Other (requests waffle mattress pad due to pain in 

butt while laying in bed)


Neurological: Reports: No Symptoms


Psychiatric: Reports: No Symptoms





- Patient Data


Vitals - Most Recent: 


 Last Vital Signs











Temp  96.5 F   03/25/19 03:14


 


Pulse  67   03/25/19 03:14


 


Resp  19   03/25/19 03:14


 


BP  104/57 L  03/25/19 03:14


 


Pulse Ox  96   03/25/19 03:14











Weight - Most Recent: 31.751 kg


I&O - Last 24 Hours: 


 Intake & Output











 03/24/19 03/25/19 03/25/19





 22:59 06:59 14:59


 


Intake Total  880 


 


Output Total  400 


 


Balance  480 











Lab Results Last 24 Hours: 


 Laboratory Results - last 24 hr











  03/24/19 03/24/19 03/24/19 Range/Units





  12:27 12:27 12:27 


 


WBC  5.06    (4.0-11.0)  K/uL


 


RBC  4.94    (4.30-5.90)  M/uL


 


Hgb  13.6    (12.0-16.0)  g/dL


 


Hct  41.2    (36.0-46.0)  %


 


MCV  83.4    (80.0-98.0)  fL


 


MCH  27.5    (27.0-32.0)  pg


 


MCHC  33.0    (31.0-37.0)  g/dL


 


RDW Std Deviation  46.5    (28.0-62.0)  fl


 


RDW Coeff of Brennon  15    (11.0-15.0)  %


 


Plt Count  166    (150-400)  K/uL


 


MPV  9.10    (7.40-12.00)  fL


 


Neut % (Auto)  48.6    (48.0-80.0)  %


 


Lymph % (Auto)  40.7 H    (16.0-40.0)  %


 


Mono % (Auto)  8.7    (0.0-15.0)  %


 


Eos % (Auto)  1.8    (0.0-7.0)  %


 


Baso % (Auto)  0.2    (0.0-1.5)  %


 


Neut # (Auto)  2.5    (1.4-5.7)  K/uL


 


Lymph # (Auto)  2.1    (0.6-2.4)  K/uL


 


Mono # (Auto)  0.4    (0.0-0.8)  K/uL


 


Eos # (Auto)  0.1    (0.0-0.7)  K/uL


 


Baso # (Auto)  0.0    (0.0-0.1)  K/uL


 


Nucleated RBC %  0.0    /100WBC


 


Nucleated RBCs #  0    K/uL


 


Sodium   143   (136-145)  mmol/L


 


Potassium   3.2 L   (3.5-5.1)  mmol/L


 


Chloride   106   ()  mmol/L


 


Carbon Dioxide   23.3   (21.0-32.0)  mmol/L


 


BUN   53 H   (7.0-18.0)  mg/dL


 


Creatinine   1.8 H   (0.6-1.0)  mg/dL


 


Est Cr Clr Drug Dosing   10.62   mL/min


 


Estimated GFR (MDRD)   26.5   ml/min


 


Glucose   90   ()  mg/dL


 


Calcium   11.8 H   (8.5-10.1)  mg/dL


 


Magnesium     (1.8-2.4)  mg/dL


 


Total Bilirubin   0.9   (0.2-1.0)  mg/dL


 


AST   31   (15-37)  IU/L


 


ALT   32   (14-63)  IU/L


 


Alkaline Phosphatase   63   ()  U/L


 


Creatine Kinase    17 L  ()  U/L


 


Troponin I   < 0.050   (0.000-0.056)  ng/mL


 


Total Protein   8.0   (6.4-8.2)  g/dL


 


Albumin   2.4 L   (3.4-5.0)  g/dL


 


Globulin   5.6 H   (2.6-4.0)  g/dL


 


Albumin/Globulin Ratio   0.4 L   (0.9-1.6)  


 


Urine Color     


 


Urine Appearance     


 


Urine pH     (5.0-8.0)  


 


Ur Specific Gravity     (1.001-1.035)  


 


Urine Protein     (NEGATIVE)  mg/dL


 


Urine Glucose (UA)     (NEGATIVE)  mg/dL


 


Urine Ketones     (NEGATIVE)  mg/dL


 


Urine Occult Blood     (NEGATIVE)  


 


Urine Nitrite     (NEGATIVE)  


 


Urine Bilirubin     (NEGATIVE)  


 


Urine Urobilinogen     (<2.0)  EU/dL


 


Ur Leukocyte Esterase     (NEGATIVE)  


 


Urine RBC     (0-2/HPF)  


 


Urine WBC     (0-5/HPF)  


 


Ur Epithelial Cells     (NONE-FEW)  


 


Urine Bacteria     (NEGATIVE)  


 


Urine Mucus     (NONE-MOD)  














  03/24/19 03/25/19 03/25/19 Range/Units





  13:52 06:27 06:27 


 


WBC    5.71  (4.0-11.0)  K/uL


 


RBC    4.17 L  (4.30-5.90)  M/uL


 


Hgb    11.6 L  (12.0-16.0)  g/dL


 


Hct    35.1 L  (36.0-46.0)  %


 


MCV    84.2  (80.0-98.0)  fL


 


MCH    27.8  (27.0-32.0)  pg


 


MCHC    33.0  (31.0-37.0)  g/dL


 


RDW Std Deviation    47.0  (28.0-62.0)  fl


 


RDW Coeff of Brennon    15  (11.0-15.0)  %


 


Plt Count    151  (150-400)  K/uL


 


MPV    8.90  (7.40-12.00)  fL


 


Neut % (Auto)    56.9  (48.0-80.0)  %


 


Lymph % (Auto)    34.3  (16.0-40.0)  %


 


Mono % (Auto)    6.3  (0.0-15.0)  %


 


Eos % (Auto)    2.3  (0.0-7.0)  %


 


Baso % (Auto)    0.2  (0.0-1.5)  %


 


Neut # (Auto)    3.3  (1.4-5.7)  K/uL


 


Lymph # (Auto)    2.0  (0.6-2.4)  K/uL


 


Mono # (Auto)    0.4  (0.0-0.8)  K/uL


 


Eos # (Auto)    0.1  (0.0-0.7)  K/uL


 


Baso # (Auto)    0.0  (0.0-0.1)  K/uL


 


Nucleated RBC %    0.0  /100WBC


 


Nucleated RBCs #    0  K/uL


 


Sodium   143   (136-145)  mmol/L


 


Potassium   3.1 L   (3.5-5.1)  mmol/L


 


Chloride   111 H   ()  mmol/L


 


Carbon Dioxide   18.2 L   (21.0-32.0)  mmol/L


 


BUN   43 H   (7.0-18.0)  mg/dL


 


Creatinine   1.5 H   (0.6-1.0)  mg/dL


 


Est Cr Clr Drug Dosing   12.74   mL/min


 


Estimated GFR (MDRD)   32.7   ml/min


 


Glucose   68 L   ()  mg/dL


 


Calcium   10.0   (8.5-10.1)  mg/dL


 


Magnesium   2.2   (1.8-2.4)  mg/dL


 


Total Bilirubin   0.8   (0.2-1.0)  mg/dL


 


AST   27   (15-37)  IU/L


 


ALT   25   (14-63)  IU/L


 


Alkaline Phosphatase   51   ()  U/L


 


Creatine Kinase     ()  U/L


 


Troponin I     (0.000-0.056)  ng/mL


 


Total Protein   6.4   (6.4-8.2)  g/dL


 


Albumin   1.9 L   (3.4-5.0)  g/dL


 


Globulin   4.5 H   (2.6-4.0)  g/dL


 


Albumin/Globulin Ratio   0.4 L   (0.9-1.6)  


 


Urine Color  YELLOW    


 


Urine Appearance  SLT CLOUDY    


 


Urine pH  5.5    (5.0-8.0)  


 


Ur Specific Gravity  >= 1.030    (1.001-1.035)  


 


Urine Protein  30 H    (NEGATIVE)  mg/dL


 


Urine Glucose (UA)  NEGATIVE    (NEGATIVE)  mg/dL


 


Urine Ketones  NEGATIVE    (NEGATIVE)  mg/dL


 


Urine Occult Blood  NEGATIVE    (NEGATIVE)  


 


Urine Nitrite  NEGATIVE    (NEGATIVE)  


 


Urine Bilirubin  NEGATIVE    (NEGATIVE)  


 


Urine Urobilinogen  0.2    (<2.0)  EU/dL


 


Ur Leukocyte Esterase  NEGATIVE    (NEGATIVE)  


 


Urine RBC  0-1    (0-2/HPF)  


 


Urine WBC  0-3    (0-5/HPF)  


 


Ur Epithelial Cells  OCCASIONAL    (NONE-FEW)  


 


Urine Bacteria  FEW    (NEGATIVE)  


 


Urine Mucus  LIGHT    (NONE-MOD)  











Med Orders - Current: 


 Current Medications





Sodium Chloride (Normal Saline)  500 mls @ 999 mls/hr IV STAT [facility]


   Last Admin: 03/24/19 12:29 Dose:  999 mls/hr


Sodium Chloride (Normal Saline)  1,000 mls @ 125 mls/hr IV STAT [facility]


   Last Admin: 03/24/19 14:25 Dose:  125 mls/hr


Sodium Chloride (Normal Saline)  1,000 mls @ 100 mls/hr IV ASDIRECTED [facility]


   Last Admin: 03/25/19 03:09 Dose:  100 mls/hr


Morphine Sulfate (Morphine)  1 mg IVPUSH Q2H PRN


   PRN Reason: Pain (severe 7-10)


   Stop: 03/25/19 16:27


Ondansetron HCl (Zofran)  4 mg IVPUSH Q6H [facility]


   Last Admin: 03/25/19 04:19 Dose:  4 mg





Discontinued Medications





Ondansetron HCl (Zofran)  4 mg IVPUSH ONETIME ONE


   Stop: 03/24/19 12:07


   Last Admin: 03/24/19 12:29 Dose:  4 mg











- Exam


General: Alert, Oriented, Cooperative, No Acute Distress, Other (severely 

cachetic in appearance.)


HEENT: Other ([place])


Lungs: Rhonchi (upper rhonchi post swallowing, clears with coughing)


Cardiovascular: Regular Rate, Regular Rhythm, No Murmurs


GI/Abdominal Exam: Normal Bowel Sounds, Soft, Non-Tender


Extremities: Normal Inspection, Normal Range of Motion, Non-Tender, No Pedal 

Edema


Skin: Warm, Dry


Neurological: No New Focal Deficit


Psy/Mental Status: Alert, Normal Affect, Normal Mood





- Problem List & Annotations


(1) Palliative care status


SNOMED Code(s): 996211566


   Code(s): Z51.5 - ENCOUNTER FOR PALLIATIVE CARE   Status: Acute   Current 

Visit: Yes   





(2) Dehydration


SNOMED Code(s): 82255890


   Code(s): E86.0 - DEHYDRATION   Status: Acute   Priority: High   Current Visit

: Yes   





(3) Dysphagia


SNOMED Code(s): 35952040, 307829099


   Code(s): R13.10 - DYSPHAGIA, UNSPECIFIED   Status: Acute   Priority: Medium 

  Current Visit: Yes   


Qualifiers: 


   Dysphagia type: esophageal phase   Qualified Code(s): R13.10 - Dysphagia, 

unspecified   





(4) General weakness


SNOMED Code(s): 07170820


   Code(s): R53.1 - WEAKNESS   Status: Chronic   Priority: High   Current Visit

: Yes   





(5) EDWARD (acute kidney injury)


SNOMED Code(s): 41629646


   Code(s): N17.9 - ACUTE KIDNEY FAILURE, UNSPECIFIED   Status: Acute   Current 

Visit: No   





(6) Renal insufficiency


SNOMED Code(s): 768100603, 424722376


   Code(s): N28.9 - DISORDER OF KIDNEY AND URETER, UNSPECIFIED   Status: 

Chronic   Priority: High   Current Visit: Yes   





(7) Severe protein-calorie malnutrition


SNOMED Code(s): 757183377, 684974008, 938730454


   Code(s): E43 - UNSPECIFIED SEVERE PROTEIN-CALORIE MALNUTRITION   Status: 

Chronic   Priority: High   Current Visit: Yes   





(8) Hx of rheumatoid arthritis


SNOMED Code(s): 651246205


   Code(s): Z87.39 - PERSONAL HISTORY OF DISEASES OF THE MS SYS AND CONN TISS   

Status: Chronic   Current Visit: No   





(9) Hx of head and neck radiation


SNOMED Code(s): 821911015


   Code(s): Z92.3 - PERSONAL HISTORY OF IRRADIATION   Status: Chronic   Current 

Visit: Yes   





- Problem List Review


Problem List Initiated/Reviewed/Updated: Yes





- Plan


Plan:: 





This 89 year old female admitted with dehydration and EDWARD secondary to limited 

oral intake due to long standing dysphagia





1. Dehydration: Slightly improved. IV access lost this morning. Samantha is 

declining any further IV attempts. Understands she is at high risk for 

worsening dehydration and renal injury. Daughter at bedside, agrees with Samantha 

and they do not want large IV such as PICC line placed or feeding tube. 





2. Dysphagia: Long standing from history of neck radiation years ago. Continues 

to drink some fluids, but is unable to eat or drink amount of calorie to 

sustain hydration status. Spoke with Samantha and daughters about Hospice, they 

would like to speak with them, but are unsure if they will accept. Currently 

palliative measures due to dysphagia. Continue with mechanical soft diet and 

protein drinks as tolerates. 





3. Generalized weakness: PT/OT consulted, but patient declined them this 

morning. 





VTE prophylaxis: SCDs





Dispo: Plan was to have [facility] intake tomorrow with Dr Liu. She will be able 

to enter [facility] at the earliest tomorrow without seeing Dr Liu. I have called 

and left a message with Dr Liu regarding current admission and plans to 

consult Hospice. Will keep him updated on treatment plan and transfer to [facility].








<Geo Babb - Last Filed: 03/25/19 15:07>





- General Info


Admission Dx/Problem (Free Text): 





I have seen and examined the patient independently of Kristine Laboy CNP. I have 

discussed the case with her. I have reviewed and agree with the assessment and 

plan of care for the patient as outlined by her. Please see orders.





- Patient Data


Vitals - Most Recent: 


 Last Vital Signs











Temp  35.8 C   03/25/19 03:14


 


Pulse  67   03/25/19 03:14


 


Resp  19   03/25/19 03:14


 


BP  104/57 L  03/25/19 03:14


 


Pulse Ox  96   03/25/19 03:14











I&O - Last 24 Hours: 


 Intake & Output











 03/25/19 03/25/19 03/25/19





 06:59 14:59 22:59


 


Intake Total 880  


 


Output Total 400  


 


Balance 480  











Lab Results Last 24 Hours: 


 Laboratory Results - last 24 hr











  03/24/19 03/25/19 03/25/19 Range/Units





  13:52 06:27 06:27 


 


WBC    5.71  (4.0-11.0)  K/uL


 


RBC    4.17 L  (4.30-5.90)  M/uL


 


Hgb    11.6 L  (12.0-16.0)  g/dL


 


Hct    35.1 L  (36.0-46.0)  %


 


MCV    84.2  (80.0-98.0)  fL


 


MCH    27.8  (27.0-32.0)  pg


 


MCHC    33.0  (31.0-37.0)  g/dL


 


RDW Std Deviation    47.0  (28.0-62.0)  fl


 


RDW Coeff of Brennon    15  (11.0-15.0)  %


 


Plt Count    151  (150-400)  K/uL


 


MPV    8.90  (7.40-12.00)  fL


 


Neut % (Auto)    56.9  (48.0-80.0)  %


 


Lymph % (Auto)    34.3  (16.0-40.0)  %


 


Mono % (Auto)    6.3  (0.0-15.0)  %


 


Eos % (Auto)    2.3  (0.0-7.0)  %


 


Baso % (Auto)    0.2  (0.0-1.5)  %


 


Neut # (Auto)    3.3  (1.4-5.7)  K/uL


 


Lymph # (Auto)    2.0  (0.6-2.4)  K/uL


 


Mono # (Auto)    0.4  (0.0-0.8)  K/uL


 


Eos # (Auto)    0.1  (0.0-0.7)  K/uL


 


Baso # (Auto)    0.0  (0.0-0.1)  K/uL


 


Nucleated RBC %    0.0  /100WBC


 


Nucleated RBCs #    0  K/uL


 


Sodium   143   (136-145)  mmol/L


 


Potassium   3.1 L   (3.5-5.1)  mmol/L


 


Chloride   111 H   ()  mmol/L


 


Carbon Dioxide   18.2 L   (21.0-32.0)  mmol/L


 


BUN   43 H   (7.0-18.0)  mg/dL


 


Creatinine   1.5 H   (0.6-1.0)  mg/dL


 


Est Cr Clr Drug Dosing   12.74   mL/min


 


Estimated GFR (MDRD)   32.7   ml/min


 


Glucose   68 L   ()  mg/dL


 


POC Glucose     ()  mg/dL


 


Calcium   10.0   (8.5-10.1)  mg/dL


 


Magnesium   2.2   (1.8-2.4)  mg/dL


 


Total Bilirubin   0.8   (0.2-1.0)  mg/dL


 


AST   27   (15-37)  IU/L


 


ALT   25   (14-63)  IU/L


 


Alkaline Phosphatase   51   ()  U/L


 


Total Protein   6.4   (6.4-8.2)  g/dL


 


Albumin   1.9 L   (3.4-5.0)  g/dL


 


Globulin   4.5 H   (2.6-4.0)  g/dL


 


Albumin/Globulin Ratio   0.4 L   (0.9-1.6)  


 


Urine RBC  0-1    (0-2/HPF)  


 


Urine WBC  0-3    (0-5/HPF)  


 


Ur Epithelial Cells  OCCASIONAL    (NONE-FEW)  


 


Urine Bacteria  FEW    (NEGATIVE)  


 


Urine Mucus  LIGHT    (NONE-MOD)  














  03/25/19 Range/Units





  08:07 


 


WBC   (4.0-11.0)  K/uL


 


RBC   (4.30-5.90)  M/uL


 


Hgb   (12.0-16.0)  g/dL


 


Hct   (36.0-46.0)  %


 


MCV   (80.0-98.0)  fL


 


MCH   (27.0-32.0)  pg


 


MCHC   (31.0-37.0)  g/dL


 


RDW Std Deviation   (28.0-62.0)  fl


 


RDW Coeff of Brennon   (11.0-15.0)  %


 


Plt Count   (150-400)  K/uL


 


MPV   (7.40-12.00)  fL


 


Neut % (Auto)   (48.0-80.0)  %


 


Lymph % (Auto)   (16.0-40.0)  %


 


Mono % (Auto)   (0.0-15.0)  %


 


Eos % (Auto)   (0.0-7.0)  %


 


Baso % (Auto)   (0.0-1.5)  %


 


Neut # (Auto)   (1.4-5.7)  K/uL


 


Lymph # (Auto)   (0.6-2.4)  K/uL


 


Mono # (Auto)   (0.0-0.8)  K/uL


 


Eos # (Auto)   (0.0-0.7)  K/uL


 


Baso # (Auto)   (0.0-0.1)  K/uL


 


Nucleated RBC %   /100WBC


 


Nucleated RBCs #   K/uL


 


Sodium   (136-145)  mmol/L


 


Potassium   (3.5-5.1)  mmol/L


 


Chloride   ()  mmol/L


 


Carbon Dioxide   (21.0-32.0)  mmol/L


 


BUN   (7.0-18.0)  mg/dL


 


Creatinine   (0.6-1.0)  mg/dL


 


Est Cr Clr Drug Dosing   mL/min


 


Estimated GFR (MDRD)   ml/min


 


Glucose   ()  mg/dL


 


POC Glucose  66  ()  mg/dL


 


Calcium   (8.5-10.1)  mg/dL


 


Magnesium   (1.8-2.4)  mg/dL


 


Total Bilirubin   (0.2-1.0)  mg/dL


 


AST   (15-37)  IU/L


 


ALT   (14-63)  IU/L


 


Alkaline Phosphatase   ()  U/L


 


Total Protein   (6.4-8.2)  g/dL


 


Albumin   (3.4-5.0)  g/dL


 


Globulin   (2.6-4.0)  g/dL


 


Albumin/Globulin Ratio   (0.9-1.6)  


 


Urine RBC   (0-2/HPF)  


 


Urine WBC   (0-5/HPF)  


 


Ur Epithelial Cells   (NONE-FEW)  


 


Urine Bacteria   (NEGATIVE)  


 


Urine Mucus   (NONE-MOD)  











Med Orders - Current: 


 Current Medications





Acetaminophen (Tylenol)  650 mg RECTAL Q4H PRN


   PRN Reason: Pain


Acetaminophen (Tylenol)  650 mg PO Q4H PRN


   PRN Reason: Pain


Ondansetron HCl (Zofran Odt)  4 mg PO Q4H PRN


   PRN Reason: Nausea/Vomiting





Discontinued Medications





Sodium Chloride (Normal Saline)  500 mls @ 999 mls/hr IV STAT [facility]


   Last Admin: 03/24/19 12:29 Dose:  999 mls/hr


Sodium Chloride (Normal Saline)  1,000 mls @ 125 mls/hr IV STAT [facility]


   Last Admin: 03/24/19 14:25 Dose:  125 mls/hr


Sodium Chloride (Normal Saline)  1,000 mls @ 100 mls/hr IV ASDIRECTED [facility]


   Last Admin: 03/25/19 03:09 Dose:  100 mls/hr


Morphine Sulfate (Morphine)  1 mg IVPUSH Q2H PRN


   PRN Reason: Pain (severe 7-10)


   Stop: 03/25/19 16:27


Ondansetron HCl (Zofran)  4 mg IVPUSH ONETIME ONE


   Stop: 03/24/19 12:07


   Last Admin: 03/24/19 12:29 Dose:  4 mg


Ondansetron HCl (Zofran)  4 mg IVPUSH Q6H [facility]


   Last Admin: 03/25/19 10:13 Dose:  Not Given











- Problem List & Annotations


(1) Dehydration


SNOMED Code(s): 27805420


   Code(s): E86.0 - DEHYDRATION   Status: Acute   Priority: High   Current Visit

: Yes   





(2) Severe protein-calorie malnutrition


SNOMED Code(s): 520083920, 811382551, 720736538


   Code(s): E43 - UNSPECIFIED SEVERE PROTEIN-CALORIE MALNUTRITION   Status: 

Chronic   Priority: High   Current Visit: Yes   





(3) Renal insufficiency


SNOMED Code(s): 037733489, 030391558


   Code(s): N28.9 - DISORDER OF KIDNEY AND URETER, UNSPECIFIED   Status: 

Chronic   Priority: High   Current Visit: Yes   





(4) General weakness


SNOMED Code(s): 19230419


   Code(s): R53.1 - WEAKNESS   Status: Chronic   Priority: High   Current Visit

: Yes   





(5) Hx of lymphoma


SNOMED Code(s): 972531933


   Code(s): Z85.72 - PERSONAL HISTORY OF NON-HODGKIN LYMPHOMAS   Status: 

Chronic   Priority: Medium   Current Visit: Yes   





(6) Dysphagia


SNOMED Code(s): 89730218, 095905970


   Code(s): R13.10 - DYSPHAGIA, UNSPECIFIED   Status: Acute   Priority: Medium 

  Current Visit: Yes   


Qualifiers: 


   Dysphagia type: esophageal phase   Qualified Code(s): R13.10 - Dysphagia, 

unspecified   





- My Orders


Last 24 Hours: 


My Active Orders





03/24/19 16:25


Bedrest Bedside Commode [RC] ASDIRECTED 


VTE/DVT Education [RC] PER UNIT ROUTINE 


Vital Signs [RC] Q8H 


Consult to Hospice [CONS] Routine 


VTE Mechanical Contraindications [AST] Per Unit Routine 


Resuscitation Status Routine 





03/24/19 16:26


Antiembolic Hose [OM.PC] Per Unit Routine 





03/24/19 16:27


Antiembolic Devices [RC] PER UNIT ROUTINE 





03/24/19 17:25


PREALBUMIN [REF] Routine

## 2019-03-26 VITALS — SYSTOLIC BLOOD PRESSURE: 104 MMHG | DIASTOLIC BLOOD PRESSURE: 57 MMHG

## 2019-03-26 NOTE — [id number]
<Hazel Santos - Last Filed: 03/26/19 09:18>





**Discharge Summary





- Hospital Course


Free Text/Narrative:: 


Admission Date: 3/24/19


Discharge Date: 3/26/19





Admission Diagnosis:


1. Dehydration


2. 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


3. Generalized weakness


4. EDWARD





Discharge Diagnosis:


1. Dehydration


2. 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


3. Generalized weakness


4. EDWARD


5. Hospice 








Procedures: None





Consults: Hospice





Hospital Course: The patient is a 89 year old female with past medical history 

of Hodgkin's lymphoma and radiation esophagitis who presented to the ER with 

weakness and inability to swallow.  She had been eating/drinking very little 

due to pain associated with swallowing. Workup found a EDWARD and hypokalemia.  

CXR showed no acute cardiopulmonary process.  UA was positive for protein but 

no infection.  She was admitted to the medical surgical floor.  She was treated 

with IVF and her kidney function slightly improved. PT/OT was ordered but the 

patient refused.  On the second day of discharge, she lost IV access.  At that 

point a discussion was had with the patient and the family and they decided no 

more pokes, no more labs, no PICC line, and no feeding tube.  At this point 

hospice consult was placed.  The family had a discussion with hospice and it 

was decided that the patient would go to [place] on hospice cares.  The case was 

discussed with Dr. Liu who agreed to follow the patient at [facility].





Disposition: [facility]





Discharge Condition: vitals stable, not tolerating oral diet very well, going 

on hospice care





Discharge Instructions: diet as tolerated, activity as tolerated. Symptoms to 

report to provider include fever/chills, chest pain, shortness of breath, 

abdominal pain, erythema, or discharge/drainage.





Discharge Medications: None- Hospice will do meds at [place].





Follow-up: Dr. Liu will follow in [facility]








- Discharge Data


Discharge Date: 03/26/19


Discharge Disposition: DC/Tfer to SNF 03


Condition: Fair





- Patient Summary/Data


Consults: 


 Consultations





03/24/19 16:25


Consult to Hospice [CONS] Routine 














- Discharge Plan


Home Medications: 


 Home Meds





. [No Known Home Meds]  03/24/19 [History]








Referrals: 


Orlin Liu MD [Physician] - 





- Discharge Summary/Plan Comment


DC Time >30 min.: No





- Patient Data


Vitals - Most Recent: 


 Last Vital Signs











Temp  96.8 F   03/26/19 04:00


 


Pulse  59 L  03/26/19 04:00


 


Resp  18   03/26/19 04:00


 


BP  100/62   03/26/19 04:00


 


Pulse Ox  97   03/26/19 04:00











Weight - Most Recent: 31.751 kg


I&O - Last 24 hours: 


 Intake & Output











 03/25/19 03/26/19 03/26/19





 22:59 06:59 14:59


 


Intake Total 200 50 


 


Output Total 350 300 


 


Balance -150 -250 











Lab Results - Last 24 hrs: 


 Laboratory Results - last 24 hr











  03/25/19 03/25/19 Range/Units





  06:27 08:07 


 


POC Glucose   66  ()  mg/dL


 


Prealbumin  8.0 L   (17.0-34.0)  mg/dL











Med Orders - Current: 


 Current Medications





Acetaminophen (Tylenol)  650 mg RECTAL Q4H PRN


   PRN Reason: Pain


Acetaminophen (Tylenol)  650 mg PO Q4H PRN


   PRN Reason: Pain


Ondansetron HCl (Zofran Odt)  4 mg PO Q4H PRN


   PRN Reason: Nausea/Vomiting





Discontinued Medications





Sodium Chloride (Normal Saline)  500 mls @ 999 mls/hr IV STAT [facility]


   Last Admin: 03/24/19 12:29 Dose:  999 mls/hr


Sodium Chloride (Normal Saline)  1,000 mls @ 125 mls/hr IV STAT [facility]


   Last Admin: 03/24/19 14:25 Dose:  125 mls/hr


Sodium Chloride (Normal Saline)  1,000 mls @ 100 mls/hr IV ASDIRECTED [facility]


   Last Admin: 03/25/19 03:09 Dose:  100 mls/hr


Morphine Sulfate (Morphine)  1 mg IVPUSH Q2H PRN


   PRN Reason: Pain (severe 7-10)


   Stop: 03/25/19 16:27


Ondansetron HCl (Zofran)  4 mg IVPUSH ONETIME ONE


   Stop: 03/24/19 12:07


   Last Admin: 03/24/19 12:29 Dose:  4 mg


Ondansetron HCl (Zofran)  4 mg IVPUSH Q6H [facility]


   Last Admin: 03/25/19 10:13 Dose:  Not Given











*Q Meaningful Use (DIS)





- VTE *Q


VTE Mechanical Contraindications *Q: At Risk for Falls





<Geo Babb - Last Filed: 03/26/19 17:09>





**Discharge Summary





- Hospital Course


Free Text/Narrative:: 





I have examined the patient independently of Hazel Santos MD, resident. I 

have discussed the case with her. I have reviewed and agree with the plan of 

care as outlined by her. Please see orders. Palliative care measures instituted.








- Discharge Diagnosis/Problem(s)


(1) Dehydration


SNOMED Code(s): 70948820


   ICD Code: E86.0 - DEHYDRATION   Status: Acute   Priority: High   





(2) Severe protein-calorie malnutrition


SNOMED Code(s): 294051723, 661744459, 054595021


   ICD Code: E43 - UNSPECIFIED SEVERE PROTEIN-CALORIE MALNUTRITION   Status: 

Chronic   Priority: High   





(3) Renal insufficiency


SNOMED Code(s): 880742093, 682951037


   ICD Code: N28.9 - DISORDER OF KIDNEY AND URETER, UNSPECIFIED   Status: 

Chronic   Priority: High   





(4) General weakness


SNOMED Code(s): 80958754


   ICD Code: R53.1 - WEAKNESS   Status: Chronic   Priority: High   





(5) Hx of lymphoma


SNOMED Code(s): 612465304


   ICD Code: Z85.72 - PERSONAL HISTORY OF NON-HODGKIN LYMPHOMAS   Status: 

Chronic   Priority: Medium   





(6) Dysphagia


SNOMED Code(s): 73149977, 279033304


   ICD Code: R13.10 - DYSPHAGIA, UNSPECIFIED   Status: Acute   Priority: Medium

   


Qualifiers: 


   Dysphagia type: esophageal phase   Qualified Code(s): R13.10 - Dysphagia, 

unspecified   





- Patient Summary/Data


Consults: 


 Consultations





03/24/19 16:25


Consult to Hospice [CONS] Routine 














- Patient Data


Vitals - Most Recent: 


 Last Vital Signs











Temp  36.7 C   03/26/19 09:31


 


Pulse  58 L  03/26/19 09:31


 


Resp  12   03/26/19 09:31


 


BP  104/57 L  03/26/19 09:31


 


Pulse Ox  92 L  03/26/19 09:31











I&O - Last 24 hours: 


 Intake & Output











 03/26/19 03/26/19 03/26/19





 06:59 14:59 22:59


 


Intake Total 50  


 


Output Total 300  


 


Balance -250  











Lab Results - Last 24 hrs: 


 Laboratory Results - last 24 hr











  03/25/19 Range/Units





  06:27 


 


Prealbumin  8.0 L  (17.0-34.0)  mg/dL











Med Orders - Current: 


 Current Medications








Discontinued Medications





Acetaminophen (Tylenol)  650 mg RECTAL Q4H PRN


   PRN Reason: Pain


Acetaminophen (Tylenol)  650 mg PO Q4H PRN


   PRN Reason: Pain


Sodium Chloride (Normal Saline)  500 mls @ 999 mls/hr IV STAT [facility]


   Last Admin: 03/24/19 12:29 Dose:  999 mls/hr


Sodium Chloride (Normal Saline)  1,000 mls @ 125 mls/hr IV STAT [facility]


   Last Admin: 03/24/19 14:25 Dose:  125 mls/hr


Sodium Chloride (Normal Saline)  1,000 mls @ 100 mls/hr IV ASDIRECTED [facility]


   Last Admin: 03/25/19 03:09 Dose:  100 mls/hr


Morphine Sulfate (Morphine)  1 mg IVPUSH Q2H PRN


   PRN Reason: Pain (severe 7-10)


   Stop: 03/25/19 16:27


Ondansetron HCl (Zofran)  4 mg IVPUSH ONETIME ONE


   Stop: 03/24/19 12:07


   Last Admin: 03/24/19 12:29 Dose:  4 mg


Ondansetron HCl (Zofran)  4 mg IVPUSH Q6H [facility]


   Last Admin: 03/25/19 10:13 Dose:  Not Given


Ondansetron HCl (Zofran Odt)  4 mg PO Q4H PRN


   PRN Reason: Nausea/Vomiting